# Patient Record
Sex: FEMALE | Race: WHITE | Employment: PART TIME | URBAN - METROPOLITAN AREA
[De-identification: names, ages, dates, MRNs, and addresses within clinical notes are randomized per-mention and may not be internally consistent; named-entity substitution may affect disease eponyms.]

---
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
## 2021-06-02 ENCOUNTER — HOSPITAL ENCOUNTER (EMERGENCY)
Age: 21
Discharge: HOME | End: 2021-06-02
Attending: EMERGENCY MEDICINE

## 2021-06-02 ENCOUNTER — APPOINTMENT (OUTPATIENT)
Dept: CT IMAGING | Age: 21
End: 2021-06-02

## 2021-06-02 VITALS
OXYGEN SATURATION: 98 % | SYSTOLIC BLOOD PRESSURE: 120 MMHG | DIASTOLIC BLOOD PRESSURE: 79 MMHG | TEMPERATURE: 98.4 F | HEART RATE: 71 BPM | RESPIRATION RATE: 16 BRPM

## 2021-06-02 DIAGNOSIS — S06.0X1A CONCUSSION WITH LOSS OF CONSCIOUSNESS OF 30 MINUTES OR LESS, INITIAL ENCOUNTER: Primary | ICD-10-CM

## 2021-06-02 PROCEDURE — 70450 CT HEAD/BRAIN W/O DYE: CPT

## 2021-06-02 PROCEDURE — 6370000000 HC RX 637 (ALT 250 FOR IP): Performed by: STUDENT IN AN ORGANIZED HEALTH CARE EDUCATION/TRAINING PROGRAM

## 2021-06-02 PROCEDURE — 99285 EMERGENCY DEPT VISIT HI MDM: CPT

## 2021-06-02 RX ORDER — ONDANSETRON 4 MG/1
4 TABLET, ORALLY DISINTEGRATING ORAL ONCE
Status: COMPLETED | OUTPATIENT
Start: 2021-06-02 | End: 2021-06-02

## 2021-06-02 RX ORDER — ACETAMINOPHEN 500 MG
1000 TABLET ORAL ONCE
Status: COMPLETED | OUTPATIENT
Start: 2021-06-02 | End: 2021-06-02

## 2021-06-02 RX ADMIN — ONDANSETRON 4 MG: 4 TABLET, ORALLY DISINTEGRATING ORAL at 03:30

## 2021-06-02 RX ADMIN — ACETAMINOPHEN 1000 MG: 500 TABLET ORAL at 03:30

## 2021-06-02 ASSESSMENT — VISUAL ACUITY
OD: 20/20
OU: 20/20
OS: 20/40

## 2021-06-02 ASSESSMENT — ENCOUNTER SYMPTOMS
VOMITING: 0
SHORTNESS OF BREATH: 0
PHOTOPHOBIA: 1
RHINORRHEA: 0
NAUSEA: 0
FACIAL SWELLING: 0
ABDOMINAL PAIN: 0
WHEEZING: 0

## 2021-06-02 ASSESSMENT — PAIN SCALES - GENERAL
PAINLEVEL_OUTOF10: 10
PAINLEVEL_OUTOF10: 10

## 2021-06-02 NOTE — ED TRIAGE NOTES
Pt to ED via EMS with c/o LOC at home and nausea. Pt reports she was wrestling with son at home, and they hit heads against each other. Pt family reports +LOC. Pt reports nausea and dry heaving since LOC. Pt is alert and oriented, NAD, RR even and unlabored.

## 2021-06-02 NOTE — ED PROVIDER NOTES
[address] [facility]     Emergency Department     Faculty Attestation    I performed a history and physical examination of the patient and discussed management with the resident. I have reviewed and agree with the residents findings including all diagnostic interpretations, and treatment plans as written. Any areas of disagreement are noted on the chart. I was personally present for the key portions of any procedures. I have documented in the chart those procedures where I was not present during the key portions. I have reviewed the emergency nurses triage note. I agree with the chief complaint, past medical history, past surgical history, allergies, medications, social and family history as documented unless otherwise noted below. Documentation of the HPI, Physical Exam and Medical Decision Making performed by scribnina is based on my personal performance of the HPI, PE and MDM. For Physician Assistant/ Nurse Practitioner cases/documentation I have personally evaluated this patient and have completed at least one if not all key elements of the E/M (history, physical exam, and MDM). Additional findings are as noted. 20 yo F pt had head to head impact with her child, + loc, vomit x 1, pt recalls event & was not knocked out for long time period,   -denies other injury,   pe vss flat affect, emily eomi, photophobic, no cervical tenderness, crepitus or deformity, no pronator drift, moving extremities x 4,     MIPS completed, ct head -, > discharged,     EKG Interpretation    Interpreted by me      CRITICAL CARE: There was a high probability of clinically significant/life threatening deterioration in this patient's condition which required my urgent intervention. Total critical care time was 0 minutes. This excludes any time for separately reportable procedures.        [address]  06/02/21 0305    MIPS 415     A head CT was ordered, but not by an emergency care provider: No    A head CT was ordered by an emergency care provider, and some of the indications for ordering the head CT included  Measure Exclusions:  Patient has a ventricular shunt: No  Patient has a brain tumor: No  Patient has multi-system trauma: No  Patient is pregnant: No  Patient is taking an antiplatelet medication (excluding aspirin): No  Patient is 72years old or older: No    Signs and Symptoms:  Patients GCS was less than 15: No  Focal neurological deficit: No  Severe Headache: Yes  Vomiting: Yes  Physical signs of a basilar skull fracture: No  Coagulopathy: No  Thrombocytopenia: No  Patient suspected of taking an anticoagulant medication: No  Dangerous mechanism of injury: No      Patient had loss of consciousness OR posttraumatic amnesia AND:   Headache: Yes  Patient is 61years old or older: No  Drug or Alcohol intoxication: No  Short-term memory deficits: No  Evidence of trauma above the clavicles (any visible or detected trauma to the head or neck, including lacerations, abrasions, bruising, swelling or fracture):  No  Post-traumatic seizure: No         Ilsa Burkitt, DO  06/02/21 0401       Ilsa Burkitt, DO  06/02/21 9111

## 2021-06-02 NOTE — ED NOTES
Pt. Resting in stretcher comfortably, NAD, no needs expressed at this time. Will continue to monitor and reassess.        Skylar Chin RN  06/02/21 0984

## 2021-06-02 NOTE — ED PROVIDER NOTES
[facility] ED  Emergency Department Encounter  Emergency Medicine Resident     Pt Name: Tony Clifton  MRN: [id number]  [place] 2000  Date of evaluation: 6/2/21  PCP:  No primary care provider on file. CHIEF COMPLAINT       Chief Complaint   Patient presents with    Loss of Consciousness     hit in head by son while wrestling    Nausea       HISTORY OFPRESENT ILLNESS  (Location/Symptom, Timing/Onset, Context/Setting, Quality, Duration, Modifying Factors,Severity.)      Tony Clifton is a 21 y. o.yo female who presents with loss of consciousness after hit in the head tonight. She states she was wrestling with her 3year-old son at home when the top of his head hit the left side of her forehead. This was witnessed by family members at home. She was told that she did pass out. She recalls the events leading up to the incident. She did not vomit but she is nauseous. She also complains of a slight headache where she was hit. She states she has sensitivity to light but no blurry vision. She is also complaining of diffuse neck pain but is able to move her neck well. No back pain or pain elsewhere. No extremity numbness or tingling. She is not anticoagulated. PAST MEDICAL / SURGICAL / SOCIAL / FAMILY HISTORY      has a past medical history of ADHD (attention deficit hyperactivity disorder), Anxiety, and Urinary reflux. has a past surgical history that includes Tonsillectomy and Tympanostomy tube placement.      Social History     Socioeconomic History    Marital status: Single     Spouse name: Not on file    Number of children: Not on file    Years of education: Not on file    Highest education level: Not on file   Occupational History    Not on file   Tobacco Use    Smoking status: Never Smoker    Smokeless tobacco: Never Used   Substance and Sexual Activity    Alcohol use: No    Drug use: No    Sexual activity: Not on file   Other Topics Concern    Not on file   Social History Narrative    Not on file     Social Determinants of Health     Financial Resource Strain:     Difficulty of Paying Living Expenses:    Food Insecurity:     Worried About Running Out of Food in the Last Year:     [address] in the Last Year:    Transportation Needs:     Lack of Transportation (Medical):  Lack of Transportation (Non-Medical):    Physical Activity:     Days of Exercise per Week:     Minutes of Exercise per Session:    Stress:     Feeling of Stress :    Social Connections:     Frequency of Communication with Friends and Family:     Frequency of Social Gatherings with Friends and Family:     Attends [employer] Services:     Active Member of Clubs or Organizations:     Attends Club or Organization Meetings:     Marital Status:    Intimate Partner Violence:     Fear of Current or Ex-Partner:     Emotionally Abused:     Physically Abused:     Sexually Abused:        No family history on file. Allergies:  Patient has no known allergies. Home Medications:  Prior to Admission medications    Medication Sig Start Date End Date Taking? Authorizing Provider   ondansetron (ZOFRAN ODT) 4 MG disintegrating tablet Take 1 tablet by mouth every 8 hours as needed for Nausea 10/18/17   Zuleyma Johnson MD   ibuprofen (ADVIL;MOTRIN) 600 MG tablet Take 1 tablet by mouth every 6 hours as needed for Pain 9/3/16   Jovani Kimball MD   famotidine (PEPCID) 20 MG tablet Take 1 tablet by mouth 2 times daily 7/10/16   Mercy Shelby MD   citalopram (CELEXA) 10 MG tablet Take 40 mg by mouth daily     Historical Provider, MD   methylphenidate (CONCERTA) 18 MG CR tablet Take 36 mg by mouth every morning     Historical Provider, MD   GuanFACINE ER (INTUNIV) 1 MG TB24 Take by mouth    Historical Provider, MD       REVIEW OFSYSTEMS    (2-9 systems for level 4, 10 or more for level 5)      Review of Systems   Constitutional: Negative for chills and fever.    HENT: Negative for congestion, facial swelling Nerves: No cranial nerve deficit. Sensory: No sensory deficit. Motor: No weakness. Coordination: Coordination normal.         DIFFERENTIAL  DIAGNOSIS     PLAN (LABS / IMAGING / EKG):  Orders Placed This Encounter   Procedures    CT HEAD WO CONTRAST    Visual acuity screening       MEDICATIONS ORDERED:  Orders Placed This Encounter   Medications    acetaminophen (TYLENOL) tablet 1,000 mg    ondansetron (ZOFRAN-ODT) disintegrating tablet 4 mg       Initial MDM/Plan: 21 y.o. female who presents with loss of consciousness after low impact mechanism to the left frontal region. This was witnessed. She is GCS 15. Both are equal and reactive. No focal neurological deficit. Normal sellar Lidia exam as well. No signs of basilar skull fracture. She is nauseous but she has not vomited. The plan is [facility] CT head rules, she is low risk and will not order a CT head to evaluate for intracranial hemorrhage at this time given the low likelihood of intracranial hemorrhage based on the signs and symptoms as described above. I will however keep patient for a brief observation period and if she worsens clinically will consider imaging at that time. In the meantime, will treat with Tylenol and Zofran to treat her symptoms. She also does have diffuse neck tenderness however there is no focal pain in the midline. She has full active range of motion in bilateral rotation without any pain. I have very low consideration for acute fracture dislocation. Again, she is neuro intact. Therefore hold on cervical imaging at this time. Overall, I feel the patient likely has a concussion. Again, we will keep for brief observation period. If she clinically worsens, will consider imaging. If she is able to be discharged, will need to discharge with thorough concussion instructions and return precautions.     DIAGNOSTIC RESULTS / EMERGENCYDEPARTMENT COURSE / MDM     LABS:  Labs Reviewed - No data to display      RADIOLOGY:  CT HEAD WO CONTRAST    Result Date: 6/2/2021  EXAMINATION: CT OF THE HEAD WITHOUT CONTRAST  6/2/2021 4:17 am TECHNIQUE: CT of the head was performed without the administration of intravenous contrast. Dose modulation, iterative reconstruction, and/or weight based adjustment of the mA/kV was utilized to reduce the radiation dose to as low as reasonably achievable. COMPARISON: CT head without contrast March 24, 2018. HISTORY: ORDERING SYSTEM PROVIDED HISTORY: head injury TECHNOLOGIST PROVIDED HISTORY: head injury Decision Support Exception - unselect if not a suspected or confirmed emergency medical condition->Emergency Medical Condition (MA) Is the patient pregnant?->No Reason for Exam: head injury Acuity: Acute Type of Exam: Initial FINDINGS: BRAIN/VENTRICLES: There is no acute intracranial hemorrhage, mass effect or midline shift. No abnormal extra-axial fluid collection. The gray-white differentiation is maintained without evidence of an acute infarct. There is no evidence of hydrocephalus. ORBITS: The visualized portion of the orbits demonstrate no acute abnormality. SINUSES: The visualized paranasal sinuses and mastoid air cells demonstrate no acute abnormality. SOFT TISSUES/SKULL:  No acute abnormality of the visualized skull or soft tissues. No acute intracranial abnormality. EKG  none    All EKG's are interpreted by the Emergency Department Physicianwho either signs or Co-signs this chart in the absence of a cardiologist.    EMERGENCY DEPARTMENT COURSE:  ED Course as of Jun 02 0516 Wed Jun 02, 2021   0359 Patient was reevaluated after approximately 1 and half hours in the emergency department. She has not had much improvement. She is about the same as when she came in. When asked where she is compared to her normal baseline, she states she is normally bouncing off the walls with energy but now she feels much more tired.   Given the duration since the incident and she is having delayed time to return back to normal, I believe is appropriate at this time to pursue a CT head to rule out serious intracranial pathology. Patient was counseled on the risk and benefits of obtaining a CT scan she is in agreement with plan to pursue this imaging.    [KD]   0515 Head is unremarkable. Patient was reevaluated, there has been a slight improvement in symptoms. I did explain to patient that she has been diagnosed with a concussion. I did give her careful discharge instructions including strict rest for the next 48 hours and a slow return to activity as she can tolerate. If she does not tolerate this activity, then she should refrain from doing that activity until she can tolerate it. She must be cleared by a physician prior to returning to any contact activity. She expresses understanding and she does have a fiancé at home back in care for her and her child. I do that she is appropriate for discharge at this time and she understands return precautions. [KD]      ED Course User Index  [KD] Etelvina Reynoso DO          PROCEDURES:  None    CONSULTS:  None    CRITICAL CARE:  none    FINAL IMPRESSION      1.  Concussion with loss of consciousness of 30 minutes or less, initial encounter          DISPOSITION / PLAN     DISPOSITION Decision To Discharge 06/02/2021 05:08:24 AM      PATIENT REFERRED TO:  [place] [address]  [phone]  Schedule an appointment as soon as possible for a visit       [facility] ED  [address] [place] [address]  [phone]    As needed      DISCHARGE MEDICATIONS:  New Prescriptions    No medications on file       Etelvina Reynoso DO  Emergency Medicine Resident    (Please note that portions of this note were completed with a voice recognition program.Efforts were made to edit the dictations but occasionally words are mis-transcribed.)        Etelvina Reynoso DO  Resident  06/02/21 8129

## 2021-06-02 NOTE — ED NOTES
Bed: 21  Expected date: 6/2/21  Expected time: 2:30 AM  Means of arrival: [facility]  Comments:  Akash Delgado RN  06/02/21 2448

## 2022-01-04 ENCOUNTER — ROUTINE PRENATAL (OUTPATIENT)
Dept: OBGYN CLINIC | Age: 22
End: 2022-01-04
Payer: MEDICAID

## 2022-01-04 VITALS
BODY MASS INDEX: 28.8 KG/M2 | DIASTOLIC BLOOD PRESSURE: 84 MMHG | HEIGHT: 67 IN | WEIGHT: 183.5 LBS | SYSTOLIC BLOOD PRESSURE: 118 MMHG

## 2022-01-04 DIAGNOSIS — Z3A.29 29 WEEKS GESTATION OF PREGNANCY: Primary | ICD-10-CM

## 2022-01-04 DIAGNOSIS — Z82.79 FAMILY HISTORY OF CLEFT LIP AND PALATE: ICD-10-CM

## 2022-01-04 DIAGNOSIS — R73.09 GLUCOSE TOLERANCE TEST ABNORMAL: ICD-10-CM

## 2022-01-04 PROCEDURE — 99202 OFFICE O/P NEW SF 15 MIN: CPT | Performed by: OBSTETRICS & GYNECOLOGY

## 2022-01-04 NOTE — PROGRESS NOTES
Kelli Stephenson  2022    YOB: 2000      HPI:  Christy Mondragon is a 24 y.o. female Kyree Dyke     Transfer of care from [facility] at 29.1 weeks gestation  Patient states she was gestational diabetic in her last pregnancy - diet controlled. She has had no complications so far in this pregnancy. Prior pregnancy was a vaginal delivery. Patient took 1 hr GTT on  and failed it at 149. Started doing home glucose monitoring and has failed most of her sugars ranging from 140- to a high of 212 but has not changed her diet at all yet. Patient is agreeable to doing 3 hr GTT to confirm diabetes and plans to start carbohydrate counting and eating mostly fruits vegetables and meat to try to be diet controlled again this pregnancy. Is feeling normal fetal movement at a minimum of 4 movements per hour for several weeks. No vaginal bleeding since missed menses. States her prior OBGYN gave her an [address] of 3/21/2022. Initiated prenatal care in second trimester at approximately 19 weeks she thinks. Patient is not involved with FOB who is the father of this pregnancy as well as her two year old son. Patient's mother said that patient was told she needed a follow up ultrasound at her anatomy US to have a more detailed evaluation of fetal facial views due to family history of cleft lip / cleft palate.           OB History    Para Term  AB Living   2 1 1     1   SAB IAB Ectopic Molar Multiple Live Births             1      # Outcome Date GA Lbr Saul/2nd Weight Sex Delivery Anes PTL Lv   2 Current            1 Term 19 38w5d   M Vag-Spont   JOSUÉ       Past Medical History:   Diagnosis Date    ADHD (attention deficit hyperactivity disorder)     Anxiety     Urinary reflux        Past Surgical History:   Procedure Laterality Date    TONSILLECTOMY      TYMPANOSTOMY TUBE PLACEMENT         MEDICATIONS:  Current Outpatient Medications   Medication Sig Dispense Refill    Calcium Carbonate Antacid (TUMS PO) Take by mouth      Prenatal Vit-Fe Fumarate-FA (PRENATAL PO) Take by mouth       No current facility-administered medications for this visit. ALLERGIES:  Allergies as of 2022    (No Known Allergies)     Review Of Systems (11 point):  Constitutional: No fever, chills or malaise; No weight change or fatigue  Head and Eyes: No vision, Headache, Dizziness or trauma in last 12 months  ENT ROS: No hearing, Tinnitis, sinus or taste problems  Hematological and Lymphatic ROS:No Lymphoma, Von Willebrand's, Hemophillia or Bleeding History  Psych ROS: No Depression, Homicidal thoughts,suicidal thoughts, or anxiety  Breast ROS: No prior breast abnormalities or lumps  Respiratory ROS: No SOB, Pneumoniae,Cough, or Pulmonary Embolism History  Cardiovascular ROS: No Chest Pain with Exertion, Palpitations, Syncope, Edema, Arrhythmia  Gastrointestinal ROS: No Indigestion, Heartburn, Nausea, vomiting, Diarrhea, Constipation,or Bowel Changes; No Bloody Stools or melena  Genito-Urinary ROS: No Dysuria, Hematuria or Nocturia. No Urinary Incontinence or Vaginal Discharge  Musculoskeletal ROS: No Arthralgia, Arthritis,Gout,Osteoporosis or Rheumatism  Neurological ROS: No CVA, Migraines, Epilepsy, Seizure Hx, or Limb Weakness  Dermatological ROS: No Rash, Itching, Hives, Mole Changes or Cancer      Vitals  BP: 118/84  Weight: 183 lb 8 oz (83.2 kg)    + FM  NO VB  NO LOF  NO CTX  No complaints or issues today     Transfer of care at 29 weeks gestation  Family hx of cleft lip/palate  Failed 1 hr GTT: 149  Scheduled for 3 hr GTT  Scheduled with MFM for facial views US follow up from anatomy  Hx GDM diet controlled last pregnancy      Assessment:  1Sonya Clifton is a 24 y.o. female  2.   3. 29w1d  4. Glucose test abnormal     Diagnosis Orders   1. 29 weeks gestation of pregnancy  Chris Casillas MD, Maternal Fetal Medicine, [facility]   2.  Glucose tolerance test abnormal  Glucose tolerance, 1 hour   3. Family history of cleft lip and palate  Lg Mars MD, Maternal Fetal Medicine, [facility]:  The patient will return to the office for her next visit in 2 weeks. See antepartum flow sheet.    3 hr GTT ordered  rx for MFM referral for US to evaluate fetal structures due to family hx of cleft lip/ palate

## 2022-01-10 ENCOUNTER — HOSPITAL ENCOUNTER (OUTPATIENT)
Age: 22
Discharge: HOME | End: 2022-01-10
Attending: OBSTETRICS & GYNECOLOGY | Admitting: OBSTETRICS & GYNECOLOGY
Payer: MEDICAID

## 2022-01-10 VITALS
SYSTOLIC BLOOD PRESSURE: 129 MMHG | RESPIRATION RATE: 18 BRPM | HEART RATE: 66 BPM | DIASTOLIC BLOOD PRESSURE: 63 MMHG | TEMPERATURE: 98 F

## 2022-01-10 PROBLEM — Z76.89: Status: ACTIVE | Noted: 2022-01-10

## 2022-01-10 PROBLEM — Z72.0 TOBACCO USE: Status: ACTIVE | Noted: 2022-01-10

## 2022-01-10 PROBLEM — R80.0 ISOLATED PROTEINURIA: Status: ACTIVE | Noted: 2022-01-10

## 2022-01-10 PROBLEM — N39.0 UTI (URINARY TRACT INFECTION): Status: ACTIVE | Noted: 2022-01-10

## 2022-01-10 PROBLEM — Z82.79 FAMILY HISTORY OF CLEFT PALATE: Status: ACTIVE | Noted: 2022-01-10

## 2022-01-10 PROBLEM — O99.810 ABNORMAL GLUCOSE TOLERANCE AFFECTING PREGNANCY, ANTEPARTUM: Status: ACTIVE | Noted: 2022-01-10

## 2022-01-10 PROBLEM — Z86.32 HISTORY OF GESTATIONAL DIABETES: Status: ACTIVE | Noted: 2022-01-10

## 2022-01-10 PROBLEM — Z3A.30 30 WEEKS GESTATION OF PREGNANCY: Status: ACTIVE | Noted: 2022-01-10

## 2022-01-10 LAB
-: ABNORMAL
ABSOLUTE EOS #: 0.12 K/UL (ref 0–0.44)
ABSOLUTE IMMATURE GRANULOCYTE: 0.09 K/UL (ref 0–0.3)
ABSOLUTE LYMPH #: 2.48 K/UL (ref 1.1–3.7)
ABSOLUTE MONO #: 1.15 K/UL (ref 0.1–1.4)
ALBUMIN SERPL-MCNC: 3.2 G/DL (ref 3.5–5.2)
ALBUMIN/GLOBULIN RATIO: 1 (ref 1–2.5)
ALP BLD-CCNC: 100 U/L (ref 35–104)
ALT SERPL-CCNC: <5 U/L (ref 5–33)
AMORPHOUS: ABNORMAL
ANION GAP SERPL CALCULATED.3IONS-SCNC: 12 MMOL/L (ref 9–17)
AST SERPL-CCNC: 22 U/L
BACTERIA: ABNORMAL
BASOPHILS # BLD: 0 % (ref 0–2)
BASOPHILS ABSOLUTE: 0.05 K/UL (ref 0–0.2)
BILIRUB SERPL-MCNC: <0.1 MG/DL (ref 0.3–1.2)
BILIRUBIN URINE: NEGATIVE
BUN BLDV-MCNC: 6 MG/DL (ref 6–20)
BUN/CREAT BLD: ABNORMAL (ref 9–20)
CALCIUM SERPL-MCNC: 8.5 MG/DL (ref 8.6–10.4)
CANDIDA SPECIES, DNA PROBE: NEGATIVE
CASTS UA: ABNORMAL /LPF (ref 0–8)
CHLORIDE BLD-SCNC: 106 MMOL/L (ref 98–107)
CO2: 15 MMOL/L (ref 20–31)
COLOR: YELLOW
CREAT SERPL-MCNC: 0.5 MG/DL (ref 0.5–0.9)
CREATININE URINE: 56 MG/DL (ref 28–217)
CRYSTALS, UA: ABNORMAL /HPF
DIFFERENTIAL TYPE: ABNORMAL
EOSINOPHILS RELATIVE PERCENT: 1 % (ref 1–4)
EPITHELIAL CELLS UA: ABNORMAL /HPF (ref 0–5)
GARDNERELLA VAGINALIS, DNA PROBE: POSITIVE
GFR AFRICAN AMERICAN: >60 ML/MIN
GFR NON-AFRICAN AMERICAN: >60 ML/MIN
GFR SERPL CREATININE-BSD FRML MDRD: ABNORMAL ML/MIN/{1.73_M2}
GFR SERPL CREATININE-BSD FRML MDRD: ABNORMAL ML/MIN/{1.73_M2}
GLUCOSE BLD-MCNC: 81 MG/DL (ref 70–99)
GLUCOSE URINE: ABNORMAL
HCT VFR BLD CALC: 32.5 % (ref 36.3–47.1)
HEMOGLOBIN: 10.3 G/DL (ref 11.9–15.1)
IMMATURE GRANULOCYTES: 1 %
KETONES, URINE: NEGATIVE
LEUKOCYTE ESTERASE, URINE: ABNORMAL
LYMPHOCYTES # BLD: 20 % (ref 25–45)
MCH RBC QN AUTO: 26.7 PG (ref 25.2–33.5)
MCHC RBC AUTO-ENTMCNC: 31.7 G/DL (ref 28.4–34.8)
MCV RBC AUTO: 84.2 FL (ref 82.6–102.9)
MONOCYTES # BLD: 9 % (ref 2–8)
MUCUS: ABNORMAL
NITRITE, URINE: NEGATIVE
NRBC AUTOMATED: 0 PER 100 WBC
OTHER OBSERVATIONS UA: ABNORMAL
PDW BLD-RTO: 13.2 % (ref 11.8–14.4)
PH UA: 7 (ref 5–8)
PLATELET # BLD: 286 K/UL (ref 138–453)
PLATELET ESTIMATE: ABNORMAL
PMV BLD AUTO: 8.5 FL (ref 8.1–13.5)
POTASSIUM SERPL-SCNC: 4.1 MMOL/L (ref 3.7–5.3)
PROTEIN UA: ABNORMAL
RBC # BLD: 3.86 M/UL (ref 3.95–5.11)
RBC # BLD: ABNORMAL 10*6/UL
RBC UA: ABNORMAL /HPF (ref 0–4)
RENAL EPITHELIAL, UA: ABNORMAL /HPF
SARS-COV-2, RAPID: NOT DETECTED
SEG NEUTROPHILS: 69 % (ref 34–64)
SEGMENTED NEUTROPHILS ABSOLUTE COUNT: 8.42 K/UL (ref 1.5–8.1)
SODIUM BLD-SCNC: 133 MMOL/L (ref 135–144)
SOURCE: ABNORMAL
SPECIFIC GRAVITY UA: 1.01 (ref 1–1.03)
SPECIMEN DESCRIPTION: NORMAL
TOTAL PROTEIN, URINE: 19 MG/DL
TOTAL PROTEIN: 6.3 G/DL (ref 6.4–8.3)
TRICHOMONAS VAGINALIS DNA: NEGATIVE
TRICHOMONAS: ABNORMAL
TURBIDITY: CLEAR
URINE HGB: NEGATIVE
URINE TOTAL PROTEIN CREATININE RATIO: 0.34 (ref 0–0.2)
UROBILINOGEN, URINE: NORMAL
WBC # BLD: 12.3 K/UL (ref 4.5–13.5)
WBC # BLD: ABNORMAL 10*3/UL
WBC UA: ABNORMAL /HPF (ref 0–5)
YEAST: ABNORMAL

## 2022-01-10 PROCEDURE — 87491 CHLMYD TRACH DNA AMP PROBE: CPT

## 2022-01-10 PROCEDURE — 84156 ASSAY OF PROTEIN URINE: CPT

## 2022-01-10 PROCEDURE — 87086 URINE CULTURE/COLONY COUNT: CPT

## 2022-01-10 PROCEDURE — 81001 URINALYSIS AUTO W/SCOPE: CPT

## 2022-01-10 PROCEDURE — 99281 EMR DPT VST MAYX REQ PHY/QHP: CPT

## 2022-01-10 PROCEDURE — 85025 COMPLETE CBC W/AUTO DIFF WBC: CPT

## 2022-01-10 PROCEDURE — 36415 COLL VENOUS BLD VENIPUNCTURE: CPT

## 2022-01-10 PROCEDURE — 82570 ASSAY OF URINE CREATININE: CPT

## 2022-01-10 PROCEDURE — 87480 CANDIDA DNA DIR PROBE: CPT

## 2022-01-10 PROCEDURE — 80053 COMPREHEN METABOLIC PANEL: CPT

## 2022-01-10 PROCEDURE — 87591 N.GONORRHOEAE DNA AMP PROB: CPT

## 2022-01-10 PROCEDURE — 87635 SARS-COV-2 COVID-19 AMP PRB: CPT

## 2022-01-10 PROCEDURE — 87660 TRICHOMONAS VAGIN DIR PROBE: CPT

## 2022-01-10 PROCEDURE — 87510 GARDNER VAG DNA DIR PROBE: CPT

## 2022-01-10 PROCEDURE — 99213 OFFICE O/P EST LOW 20 MIN: CPT

## 2022-01-10 RX ORDER — ONDANSETRON 2 MG/ML
4 INJECTION INTRAMUSCULAR; INTRAVENOUS EVERY 6 HOURS PRN
Status: DISCONTINUED | OUTPATIENT
Start: 2022-01-10 | End: 2022-01-11 | Stop reason: HOSPADM

## 2022-01-10 RX ORDER — METRONIDAZOLE 500 MG/1
500 TABLET ORAL 2 TIMES DAILY
Qty: 14 TABLET | Refills: 0 | Status: SHIPPED | OUTPATIENT
Start: 2022-01-10 | End: 2022-01-17

## 2022-01-10 RX ORDER — ACETAMINOPHEN 500 MG
1000 TABLET ORAL EVERY 6 HOURS PRN
Status: DISCONTINUED | OUTPATIENT
Start: 2022-01-10 | End: 2022-01-11 | Stop reason: HOSPADM

## 2022-01-10 RX ORDER — ONDANSETRON 4 MG/1
4 TABLET, ORALLY DISINTEGRATING ORAL EVERY 8 HOURS PRN
Status: DISCONTINUED | OUTPATIENT
Start: 2022-01-10 | End: 2022-01-11 | Stop reason: HOSPADM

## 2022-01-10 RX ORDER — NITROFURANTOIN 25; 75 MG/1; MG/1
100 CAPSULE ORAL 2 TIMES DAILY
Qty: 14 CAPSULE | Refills: 0 | Status: SHIPPED | OUTPATIENT
Start: 2022-01-10 | End: 2022-01-17

## 2022-01-10 NOTE — H&P
OBSTETRICAL HISTORY [facility]    Date: 1/10/2022       Time: 9:58 PM   Patient Name: Micheal Tucker     Patient : 2000  Room/Bed: [place] [address]    Admission Date/Time: 1/10/2022  6:10 PM      CC: periumbilical abdominal pain, nausea     HPI: Micheal Tucker is a 24 y.o.  at 30w0d who presents to Labor and Delivery with the chief complaint of periumbilical/suprapubic pain that began at approximately 1600 today. She endorses nausea but no vomiting. The pain has not changed or radiated elsewhere. She denies abdominal trauma. Denies any diarrhea or constipation. Denies recent sick contacts, rhinorrhea, congestion, cough, shortness of breath, chest pain. The patient reports fetal movement is present, denies contractions, denies loss of fluid, denies vaginal bleeding. Patient denies any headache, visual changes, difficulty breathing, RUQ pain, F/C, and pain/swelling in lower extremities. Denies any dysuria or vaginal discharge. DATING:  LMP: Patient's last menstrual period was 2021.   Estimated Date of Delivery: 3/21/22   Based on: LMP on 21    PREGNANCY RISK FACTORS:  Patient Active Problem List   Diagnosis    Family history of cleft palate    Elv 1hr GTT, no 3hr GTT    Hx GDMA1    Hx UTI x1    Tobacco use    30 weeks gestation of pregnancy        Steroids Given In This Pregnancy:  no     REVIEW OF SYSTEMS:   Constitutional: negative fever, negative chills, negative weight changes   HEENT: negative visual disturbances, negative headaches, negative dizziness, negative hearing loss  Breast: Negative breast abnormalities, negative breast lumps, negative nipple discharge  Respiratory: negative dyspnea, negative cough, negative SOB  Cardiovascular: negative chest pain,  negative palpitations, negative arrhythmia, negative syncope   Gastrointestinal: positive abdominal pain, negative RUQ pain, positive nausea, negative vomiting, negative diarrhea, negative constipation, negative bowel changes, negative heartburn   Genitourinary: negative dysuria, negative hematuria, negative urinary incontinence, negative vaginal discharge, negative vaginal bleeding or spotting  Dermatological: negative rash, negative pruritis, negative mole or other skin changes  Hematologic: negative bruising  Immunologic/Lymphatic: negative recent illness, negative recent sick contact  Musculoskeletal: negative back pain, negative myalgias, negative arthralgias  Neurological:  negative dizziness, negative migraines, negative seizures, negative weakness  Behavior/Psych: negative depression, negative anxiety, negative SI, negative HI    OBSTETRICAL HISTORY:   OB History    Para Term  AB Living   2 1 1 0 0 1   SAB IAB Ectopic Molar Multiple Live Births   0 0 0 0 0 1      # Outcome Date GA Lbr Saul/2nd Weight Sex Delivery Anes PTL Lv   2 Current            1 Term 19 38w5d   M Vag-Spont   JOSUÉ       PAST MEDICAL HISTORY:   has a past medical history of ADHD (attention deficit hyperactivity disorder), Anxiety, and Urinary reflux. PAST SURGICAL HISTORY:   has a past surgical history that includes Tonsillectomy and Tympanostomy tube placement. ALLERGIES:  has No Known Allergies. MEDICATIONS:  Prior to Admission medications    Medication Sig Start Date End Date Taking? Authorizing Provider   nitrofurantoin, macrocrystal-monohydrate, (MACROBID) 100 MG capsule Take 1 capsule by mouth 2 times daily for 7 days 1/10/22 1/17/22 Yes Keyla Lucas DO   metroNIDAZOLE (FLAGYL) 500 MG tablet Take 1 tablet by mouth 2 times daily for 7 days 1/10/22 1/17/22 Yes Keyla Lucas DO   Calcium Carbonate Antacid (TUMS PO) Take by mouth   Yes Historical Provider, MD   Prenatal Vit-Fe Fumarate-FA (PRENATAL PO) Take by mouth   Yes Historical Provider, MD       FAMILY HISTORY:  family history is not on file. SOCIAL HISTORY:   reports that she has never smoked.  She has never used smokeless tobacco. She reports that she does not drink alcohol and does not use drugs.     VITALS:  Vitals:    01/10/22 1832   BP: 129/63   Pulse: 66   Resp: 18   Temp: 98 °F (36.7 °C)         PHYSICAL EXAM:  Fetal Heart Monitor:  Baseline Heart Rate 135 bpm, moderate variability, present accelerations, absent decelerations  [place]: contractions, none    General appearance:  no apparent distress, alert and cooperative  HEENT: head atraumatic, normocephalic, moist mucous membranes, trachea midline  Neurologic:  alert, oriented, normal speech, no focal findings or movement disorder noted  Lungs:  No increased work of breathing, good air exchange  Heart:  regular rate and rhythm  Abdomen:  soft, gravid, tender to palpation midline periumbilical/ suprapubic, no rebound, guarding, or rigidity, no RUQ or epigastric tenderness, no signs or symptoms of abruption, no signs or symptoms of chorioamnionitis  Extremities:  no calf tenderness, non edematous, no varicosities, full range of motion in all four extremities  Musculoskeletal: Gross strength equal and intact throughout, no gross abnormalities, range of motion normal in hips, knees, shoulders and spine, CVA tenderness: none  Psychiatric: Mood appropriate, normal affect   Rectal Exam: not indicated  Pelvic Exam:   Chaperone for Intimate Exam: Chaperone was present for entire exam, Chaperone Name: Wyman Curling RN  Sterile Speculum Exam:   Urethral meatus: normal appearing   Vulva: Normal hair distribution, normal appearing vulva, no masses, tenderness or lesions, normal clitoris   Vagina: Normal appearing vaginal mucosa without lesions, moderate vaginal discharge noted in the posterior vault, no lacerations   Cervix: Normal appearing cervix without lesions, external os visibly closed, no lacerations or abnormal lesions visualized, no bleeding  Sterile Vaginal Exam:  Cervix: No cervical motion tenderness   Uterus: Is gravid, Normal size, shape, consistency and non-tender  Cervix: Closed dilated, 0 % effaced, -3 station, posterior position (out of 3 station), firm consistency    PRENATAL LAB RESULTS:   Blood Type/Rh: A neg  Antibody Screen: negative  Hemoglobin, Hematocrit, Platelets: Hgb 30.6/YYU 34.7/Plt 341  Rubella: immune  T. Pallidum, IgG: non-reactive  Hepatitis B Surface Antigen: non-reactive   Hepatitis C Antibody: non-reactive   HIV: non-reactive   Sickle Cell Screen: not done  Gonorrhea: not done  Chlamydia: not done  Urine culture: not done    1 hour Glucose Tolerance Test: 149  3 hour Glucose Tolerance Test: not done    Group B Strep: not done  Cystic Fibrosis Screen: not done  First Trimester Screen: not available  MSAFP/Multiple Markers: not available  Non-Invasive Prenatal Testing: not available  Anatomy US: not available    ASSESSMENT & PLAN:  Christy Mondragon is a 24 y.o. female  at 30w0d IUP   - GBS unknown / Rh negative / R immune   - Pen G for GBS prophylaxis if delivery imminent    - VSS, afebrile    Abdominal pain, nausea   - VSS, afebrile, BP normotensive   - PreE labs wnl, P/C 0.34   - CBC unremarkable, WBC wnl   - COVID19 negative   - cEFM/TOCO: Cat I tracing, no contractions   - SSE: normal vaginal mucosa, normal appearing cervix, external os visibly closed, moderate discharge present, no bleeding or lesions   - SVE: closed/thick/high   - Vaginitis positive for Bacterial vaginosis; Rx for Flagyl given   - Gc/C pending, will follow results   - UA suspicious for infection; Rx for Macrobid given   - UCx pending, will follow resultls   - FFN not collected as patient had recent intercourse   - PO hydration   - Patient declined Tylenol or Zofran   - Next OB appointment: 22   - Next MFM appointment: 22    Upon re-evaluation, patient reports resolution of her symptoms. FHT reviewed in entirety with senior resident and noted to be Category I. Patient may be discharged home.  Labor precautions, bleeding precautions, adequate PO hydration, fetal kick counts reviewed with the patient. All questions answered and concerns addressed. Patient verbalized understanding. Late transfer of care   - Patient transferred from [address] [place] [address] in [facility] at 29 weeks   - Records scanned into Media section    Elevated 1 hr GTT, no 3 hr   - Encouraged patient to complete 3 hr GTT     Hx GDMA1 (G1)    FHx cleft palate   - Patient's brother   - Patient reports she had an anatomy scan completed   - US from previous OB provider not available. Patient has MFM appt scheduled for 2/22/22 for interval scan    Tobacco use   - Encourage cessation    BMI 28    Patient Active Problem List    Diagnosis Date Noted    Family history of cleft palate 01/10/2022    Elv 1hr GTT, no 3hr GTT 01/10/2022    Hx GDMA1 01/10/2022    Hx UTI x1 01/10/2022    Tobacco use 01/10/2022    30 weeks gestation of pregnancy 01/10/2022       Plan discussed with Dr. Chai Kulkarni, who is agreeable. Steroids given this admission: No    Risks, benefits, alternatives and possible complications have been discussed in detail with the patient. Admission, and post admission procedures and expectations were discussed in detail. All questions were answered.     Attending's Name: Dr. Jason Carrera DO  Ob/Gyn Resident  1/10/2022, 9:58 PM

## 2022-01-10 NOTE — FLOWSHEET NOTE
Pt presents to [facility], accompanied by mother - garth also her legal guardian, via ambulatory from the ED. Pt here for abdominal pain since 4pm and nausea since the pt woke up this AM.    Pt gowned and in bed, oriented to room and call light. EFM explained and applied. FHT's 145. Dr. Flavia Jean notified of admission.

## 2022-01-11 LAB
C TRACH DNA GENITAL QL NAA+PROBE: NEGATIVE
CULTURE: NO GROWTH
Lab: NORMAL
N. GONORRHOEAE DNA: NEGATIVE
SPECIMEN DESCRIPTION: NORMAL
SPECIMEN DESCRIPTION: NORMAL

## 2022-01-20 ENCOUNTER — ROUTINE PRENATAL (OUTPATIENT)
Dept: OBGYN CLINIC | Age: 22
End: 2022-01-20
Payer: MEDICAID

## 2022-01-20 VITALS — BODY MASS INDEX: 29.13 KG/M2 | WEIGHT: 186 LBS | DIASTOLIC BLOOD PRESSURE: 64 MMHG | SYSTOLIC BLOOD PRESSURE: 116 MMHG

## 2022-01-20 DIAGNOSIS — Z3A.31 31 WEEKS GESTATION OF PREGNANCY: Primary | ICD-10-CM

## 2022-01-20 DIAGNOSIS — R73.09 ELEVATED GLUCOSE TOLERANCE TEST: ICD-10-CM

## 2022-01-20 DIAGNOSIS — O26.893 RH NEGATIVE STATUS DURING PREGNANCY IN THIRD TRIMESTER: ICD-10-CM

## 2022-01-20 DIAGNOSIS — Z23 NEED FOR TDAP VACCINATION: ICD-10-CM

## 2022-01-20 DIAGNOSIS — Z67.91 RH NEGATIVE STATUS DURING PREGNANCY IN THIRD TRIMESTER: ICD-10-CM

## 2022-01-20 DIAGNOSIS — O09.30 LATE PRENATAL CARE AFFECTING PREGNANCY, ANTEPARTUM: ICD-10-CM

## 2022-01-20 PROCEDURE — G8484 FLU IMMUNIZE NO ADMIN: HCPCS | Performed by: NURSE PRACTITIONER

## 2022-01-20 PROCEDURE — 90715 TDAP VACCINE 7 YRS/> IM: CPT | Performed by: NURSE PRACTITIONER

## 2022-01-20 PROCEDURE — G8419 CALC BMI OUT NRM PARAM NOF/U: HCPCS | Performed by: NURSE PRACTITIONER

## 2022-01-20 PROCEDURE — G8427 DOCREV CUR MEDS BY ELIG CLIN: HCPCS | Performed by: NURSE PRACTITIONER

## 2022-01-20 PROCEDURE — 99213 OFFICE O/P EST LOW 20 MIN: CPT | Performed by: NURSE PRACTITIONER

## 2022-01-20 PROCEDURE — 90471 IMMUNIZATION ADMIN: CPT | Performed by: NURSE PRACTITIONER

## 2022-01-20 PROCEDURE — 96372 THER/PROPH/DIAG INJ SC/IM: CPT | Performed by: NURSE PRACTITIONER

## 2022-01-20 PROCEDURE — 1036F TOBACCO NON-USER: CPT | Performed by: NURSE PRACTITIONER

## 2022-01-20 NOTE — PROGRESS NOTES
Presents for OB visit  Gestation 31w3d  Estimated Date of Delivery: 3/21/22    Transfer of care at 34 weeks gestation  Family hx of cleft lip/palate  Failed 1 hr GTT: 149  Scheduled for 3 hr GTT  Scheduled with MFM for facial views US follow up from anatomy 22  Hx GDM diet controlled last pregnancy      - antibody screening 2021- no trauma to abdomen or vaginal bleeding- it was verified she did not receive rhogam through previous OB rhogam given 2022     Do not have Anatomy US on file from [address]  contacted and stated no anatomy US on file stating they only saw her once  She has US scheduled with MFM 22        OB History    Para Term  AB Living   2 1 1     1   SAB IAB Ectopic Molar Multiple Live Births             1      # Outcome Date GA Lbr Saul/2nd Weight Sex Delivery Anes PTL Lv   2 Current            1 Term 19 38w5d   M Vag-Spont   JOSUÉ            No Known Allergies  Current Outpatient Medications   Medication Sig Dispense Refill    Calcium Carbonate Antacid (TUMS PO) Take by mouth      Prenatal Vit-Fe Fumarate-FA (PRENATAL PO) Take by mouth       No current facility-administered medications for this visit. Past Medical History:   Diagnosis Date    ADHD (attention deficit hyperactivity disorder)     Anxiety     Urinary reflux        Past Surgical History:   Procedure Laterality Date    TONSILLECTOMY      TYMPANOSTOMY TUBE PLACEMENT       Headaches: no  Swelling: no  Bleeding: no  Discharge: no   Dysuria: no  Nausea: no  Heartburn: no  Epigastric pain: no  Contractions: no  Leakage of fluid: no  + fetal movement       Return 2 WEEKS    Labs as indicated 3 hr GTT needs to be completed states going tomorrow    PTL signs reviewed, if indicated  Kick Count Instructions given if indicated: The patient was instructed on fetal kick counts and was given a kick sheet to complete every 8 hours. This is to begin at 28 weeks gestation.  She was instructed that the baby should move at a minimum of ten times within one hour after a meal. The patient was instructed to lay down on her left side twenty minutes after eating and count movements for up to one hour with a target value of ten movements. She was instructed to notify the office if she did not make that target after two attempts or if after any attempt there was less than four movements. After hour numbers reviewed , along with labor signs if indicated. Contractions/cramping between 5-7 minutes and persisting even with attempts of increased water and laying on side. Fluid leakage, bleeding    The patient was counseled on Labor & Delivery. Route of delivery and counseling on vaginal, operative vaginal, and  sections were completed with the risks of each to both the patient as well as her baby. The possibility of a blood transfusion was discussed as well. The patient was not opposed to receiving a transfusion if needed. The patient was counseled on types of analgesia during labor and is considering either Regional or IV medication the risks, benefits and alternatives were discussed. The patient was counseled on the mandatory call ahead policy. She has been instructed to call the office at anytime prior to going into the hospital so the on-call physician may direct her to the appropriate facility for care. Exceptions were reviewed including but not limited to: Decreased fetal movement, vaginal Bleeding or hemorrhage, trauma, readily expectant delivery, or any instance where she feels 911 should be utilized. Of the 20 minute duration appointment visit, Efren Kimball CNP spent at least 50% of the face-to-face time in counseling, explanation of diagnosis, planning of further management, and answering all questions.

## 2022-01-20 NOTE — PATIENT INSTRUCTIONS
After Hours Number: You can call the office [phone]  or [phone]  Call if you have:  1. Bleeding like a period  2. Cramps or contractions greater than 2 hours  3. If you are leaking fluid  4. If you've a fever greater than 100°  5. If you feel as if baby is not moving  6. If you have continuous vomiting over 3-4 hours   Counting Your Baby's Kicks: Care Instructions  Your Care Instructions    Counting your baby's kicks is one way your doctor can tell that your baby is healthy. Most women--especially in a first pregnancy--feel their baby move for the first time between 16 and 22 weeks. The movement may feel like flutters rather than kicks. Your baby may move more at certain times of the day. When you are active, you may notice less kicking than when you are resting. At your prenatal visits, your doctor will ask whether the baby is active. In your last trimester, your doctor may ask you to count the number of times you feel your baby move. Follow-up care is a key part of your treatment and safety. Be sure to make and go to all appointments, and call your doctor if you are having problems. It's also a good idea to know your test results and keep a list of the medicines you take. How do you count fetal kicks? · A common method of checking your baby's movement is to count the number of kicks or moves you feel in 1 hour. Ten movements (such as kicks, flutters, or rolls) in 1 hour are normal. Some doctors suggest that you count in the morning until you get to 10 movements. Then you can quit for that day and start again the next day. · Pick your baby's most active time of day to count. This may be any time from morning to evening. · If you do not feel 10 movements in an hour, your baby may be sleeping. Wait for the next hour and count again. When should you call for help?   Call your doctor now or seek immediate medical care if:    · You noticed that your baby has stopped moving or is moving much less than normal.    Watch closely for changes in your health, and be sure to contact your doctor if you have any problems. Where can you learn more? Go to https://chpepiceweb.[employer]. org and sign in to your Network Game Interaction account. Enter O420 in the Cyber-Rain box to learn more about \"Counting Your Baby's Kicks: Care Instructions. \"     If you do not have an account, please click on the \"Sign Up Now\" link. Current as of: September 5, 2018  Content Version: 12.0  © 2516-5100 [employer]. Care instructions adapted under license by [facility] ([facility]). If you have questions about a medical condition or this instruction, always ask your healthcare professional. [address] any warranty or liability for your use of this information. Preeclampsia: Care Instructions  Overview    Preeclampsia occurs when a woman's blood pressure rises during pregnancy. Often with preeclampsia, you also have swelling in your legs, hands, and face. A test may show too much protein in your urine. Preeclampsia is also called toxemia. If preeclampsia is severe and not treated, it can lead to seizures (eclampsia) and damage to your liver or kidneys. Preeclampsia can prevent your baby from getting enough food and oxygen. This can cause a low birth weight or other problems. Your doctor will watch you closely to prevent these problems. He or she also may recommend that you reduce your activity. If your preeclampsia is a danger to your health or the health of your baby, your doctor may need to deliver your baby early. While preeclampsia is a concern, most women with preeclampsia have healthy babies. After a woman gives birth, preeclampsia usually goes away on its own. But symptoms may last a few weeks or more and can get worse after delivery. Rarely, symptoms of preeclampsia don't show up until days or even weeks after childbirth. Follow-up care is a key part of your treatment and safety.  Be sure to make and go to all appointments, and call your doctor if you are having problems. It's also a good idea to know your test results and keep a list of the medicines you take. How can you care for yourself at home? · Take and record your blood pressure at home if your doctor tells you to. ? Learn the importance of the two measures of blood pressure (such as 120 over 80, or 120/80). The first number is the systolic pressure, which is the force of blood on the artery walls as the heart pumps. The second number is the diastolic pressure, which is the force of blood on the artery walls between heartbeats, when the heart is at rest. You have a choice of monitors to use. ? Manual monitor: You pump up the cuff and use a stethoscope to listen for your pulse. ? Electronic monitor: The cuff inflates, and a gauge shows your pulse rate. ? To take your blood pressure:  ? Ask your doctor to check your blood pressure monitor to be sure that it is accurate and that the cuff fits you. Also ask your doctor to watch you to make sure that you are using it right. ? You should not eat, use tobacco products, or use medicine known to raise blood pressure (such as some nasal decongestant sprays) before you take your blood pressure. ? Avoid taking your blood pressure if you have just exercised. Also avoid taking it if you are nervous or upset. Rest at least 15 minutes before you take your blood pressure. · If your doctor advises, check the protein levels in your urine. Your doctor or nurse will show you how to do this. · Take your medicines exactly as prescribed. Call your doctor if you think you are having a problem with your medicine. · Do not smoke. Quitting smoking will help improve your baby's growth and health. If you need help quitting, talk to your doctor about stop-smoking programs and medicines. These can increase your chances of quitting for good.   · Eat a balanced and healthy diet that has lots of fruits and vegetables. · If your doctor advised bed rest, be sure to stay off your feet and rest as much as possible. ? Keep a phone, phone book, notepad, and pen near the bed where you can easily reach them. ? Gently stretch your legs every hour to maintain good blood flow. ? Have another family member pack snacks and lunch food in a cooler close to your bed. ? Use this time for activities that you usually cannot find time for, such as reading, craft projects, or letter writing. · You can keep track of your baby's health by noting the length of time it takes to count 10 movements (such as kicks, flutters, or rolls). Feeling 10 movements in less than 1 hour is considered normal. Track your baby's movements once each day. Bring this record with you to each prenatal visit. When should you call for help? Call 911 anytime you think you may need emergency care. For example, call if:    · You passed out (lost consciousness). · You have a seizure. Call your doctor now or seek immediate medical care if:    · You have symptoms of preeclampsia, such as:  ? Sudden swelling of your face, hands, or feet. ? New vision problems (such as dimness or blurring). ? A severe headache. · Your blood pressure is higher than it should be, or it rises suddenly. · You have new nausea or vomiting. · You think that you are in labor. · You have pain in your belly or pelvis. Watch closely for changes in your health, and be sure to contact your doctor if:    · You gain weight rapidly. Where can you learn more? Go to https://[employer]ianne[employer]acle Pharm[employer] org and sign in to your Alignment Acquisitions account. Enter A856 in the X[employer] box to learn more about \"Preeclampsia: Care Instructions. \"     If you do not have an account, please click on the \"Sign Up Now\" link. Current as of: September 5, 2018  Content Version: 12.0  © 1500-9752 Healthwise, Incorporated. Care instructions adapted under license by [facility] ([facility]).  If allow others to smoke around you. If you need help quitting, talk to your doctor about stop-smoking programs and medicines. These can increase your chances of quitting for good. When should you call for help? Call 911 anytime you think you may need emergency care. For example, call if:    · You passed out (lost consciousness). · You have severe vaginal bleeding. · You have severe pain in your belly or pelvis. · You have had fluid gushing or leaking from your vagina and you know or think the umbilical cord is bulging into your vagina. If this happens, immediately get down on your knees so your rear end (buttocks) is higher than your head. This will decrease the pressure on the cord until help arrives. Call your doctor now or seek immediate medical care if:    · You have signs of preeclampsia, such as:  ? Sudden swelling of your face, hands, or feet. ? New vision problems (such as dimness or blurring). ? A severe headache. · You have any vaginal bleeding. · You have belly pain or cramping. · You have a fever. · You have had regular contractions (with or without pain) for an hour. This means that you have 6 or more within 1 hour after you change your position and drink fluids. · You have a sudden release of fluid from the vagina. · You have low back pain or pelvic pressure that does not go away. · You notice that your baby has stopped moving or is moving much less than normal.    Watch closely for changes in your health, and be sure to contact your doctor if you have any problems. Where can you learn more? Go to https://[employer].dianneOneWed ([employer] Nearlyweds[employer]. org and sign in to your Cimagine Media account. Enter Q400 in the TATE'S LIST box to learn more about \" Labor: Care Instructions. \"     If you do not have an account, please click on the \"Sign Up Now\" link. Current as of: 2018  Content Version: 12.0  © 2433-4327 Healthwise, [facility].  Care instructions adapted under license by [facility] ([facility]). If you have questions about a medical condition or this instruction, always ask your healthcare professional. [address] any warranty or liability for your use of this information.

## 2022-02-03 NOTE — PROGRESS NOTES
SUBJECTIVE:    Edda Thmopson is here for her return OB visit. She declines concerns at this time. She reports feeling fetal movement. She denies vaginal bleeding. She denies vaginal discharge. She denies leaking of fluid. She denies uterine cramping. She denies  nausea and/or vomiting. OBJECTIVE:  Blood pressure 114/64, weight 185 lb 7 oz (84.1 kg), last menstrual period 2021, not currently breastfeeding. Edda Thompson has not received the flu vaccine as appropriate. Desires at this visit. Edda Thompson has received the Tdap vaccine as appropriate  Edda Thompson has not received the COVID vaccine as appropriate. Does not desires. ASSESSMENT/PLAN:    1. High risk pregnancy, antepartum  IUP @ 34 weeks  S = D    2. 34 weeks gestation of pregnancy  - Reviewed Fetal kick counts  - Discussed signs and symptoms of  labor versus prince [place]on contractions. - Reviewed signs and symptoms of preeclampsia. - Flu vaccine administered at this visit. 3. Family history of cleft palate  - Ultrasound on  to assess for cleft lip/palate due to family history. Reviewed importance of attending ultrasound. 4. Elv 1hr GTT, no 3hr GTT  - Reinforced importance of 3 hour glucose screening. Reports has been taking blood sugars at home and they have been good, but does not have blood sugar log at this time. - Discussed getting 3 hour GTT completed ASAP and patient to do tomorrow. Unable to do today because her ride has to work. 5. Tobacco use  - Not smoking currently, stopped in second trimester. Positive reinforcement offered. 6. Rh negative state in antepartum period  - Rhogam received 22/. She was counseled regarding all of the above:    Return in about 2 weeks (around 2022) for return OB.     The patient, Edda Thompson was seen with a total time spent of 15 minutes for the visit on this date of service by the [facility]  Both face-to-face (counseling and education) and non face-to-face time (care coordination), were spent in determining the total time component.      Electronically Signed By: FIDEL Hammer CNM

## 2022-02-08 ENCOUNTER — ROUTINE PRENATAL (OUTPATIENT)
Dept: OBGYN CLINIC | Age: 22
End: 2022-02-08
Payer: MEDICAID

## 2022-02-08 VITALS
BODY MASS INDEX: 29.04 KG/M2 | SYSTOLIC BLOOD PRESSURE: 114 MMHG | DIASTOLIC BLOOD PRESSURE: 64 MMHG | WEIGHT: 185.44 LBS

## 2022-02-08 DIAGNOSIS — O99.810 ABNORMAL GLUCOSE TOLERANCE AFFECTING PREGNANCY, ANTEPARTUM: ICD-10-CM

## 2022-02-08 DIAGNOSIS — O09.90 HIGH RISK PREGNANCY, ANTEPARTUM: Primary | ICD-10-CM

## 2022-02-08 DIAGNOSIS — O26.899 RH NEGATIVE STATE IN ANTEPARTUM PERIOD: ICD-10-CM

## 2022-02-08 DIAGNOSIS — Z23 NEED FOR VACCINATION FOR H FLU TYPE B: ICD-10-CM

## 2022-02-08 DIAGNOSIS — Z82.79 FAMILY HISTORY OF CLEFT PALATE: ICD-10-CM

## 2022-02-08 DIAGNOSIS — Z67.91 RH NEGATIVE STATE IN ANTEPARTUM PERIOD: ICD-10-CM

## 2022-02-08 DIAGNOSIS — Z3A.34 34 WEEKS GESTATION OF PREGNANCY: ICD-10-CM

## 2022-02-08 DIAGNOSIS — Z72.0 TOBACCO USE: ICD-10-CM

## 2022-02-08 PROCEDURE — 99212 OFFICE O/P EST SF 10 MIN: CPT

## 2022-02-08 PROCEDURE — G0008 ADMIN INFLUENZA VIRUS VAC: HCPCS

## 2022-02-08 PROCEDURE — 90674 CCIIV4 VAC NO PRSV 0.5 ML IM: CPT

## 2022-02-09 PROBLEM — N39.0 UTI (URINARY TRACT INFECTION): Status: RESOLVED | Noted: 2022-01-10 | Resolved: 2022-02-09

## 2022-02-22 ENCOUNTER — ROUTINE PRENATAL (OUTPATIENT)
Dept: PERINATAL CARE | Age: 22
End: 2022-02-22
Payer: MEDICAID

## 2022-02-22 VITALS
RESPIRATION RATE: 16 BRPM | SYSTOLIC BLOOD PRESSURE: 125 MMHG | HEIGHT: 67 IN | WEIGHT: 194 LBS | DIASTOLIC BLOOD PRESSURE: 79 MMHG | TEMPERATURE: 97.2 F | HEART RATE: 76 BPM | BODY MASS INDEX: 30.45 KG/M2

## 2022-02-22 DIAGNOSIS — Z3A.36 36 WEEKS GESTATION OF PREGNANCY: ICD-10-CM

## 2022-02-22 DIAGNOSIS — O09.33 INSUFFICIENT PRENATAL CARE IN THIRD TRIMESTER: ICD-10-CM

## 2022-02-22 DIAGNOSIS — O35.2XX0 HEREDITARY FAMILIAL DISEASE AFFECTING MANAGEMENT OF MOTHER AND POSSIBLY AFFECTING FETUS, ANTEPARTUM, SINGLE OR UNSPECIFIED FETUS: Primary | ICD-10-CM

## 2022-02-22 DIAGNOSIS — O99.810 ABNORMAL MATERNAL GLUCOSE TOLERANCE, ANTEPARTUM: ICD-10-CM

## 2022-02-22 LAB
ABDOMINAL CIRCUMFERENCE: NORMAL
BIPARIETAL DIAMETER: NORMAL
ESTIMATED FETAL WEIGHT: NORMAL
FEMORAL DIAMETER: NORMAL
HC/AC: NORMAL
HEAD CIRCUMFERENCE: NORMAL

## 2022-02-22 PROCEDURE — 99999 PR OFFICE/OUTPT VISIT,PROCEDURE ONLY: CPT | Performed by: OBSTETRICS & GYNECOLOGY

## 2022-02-22 PROCEDURE — 76811 OB US DETAILED SNGL FETUS: CPT | Performed by: OBSTETRICS & GYNECOLOGY

## 2022-02-22 PROCEDURE — 76819 FETAL BIOPHYS PROFIL W/O NST: CPT | Performed by: OBSTETRICS & GYNECOLOGY

## 2022-02-22 NOTE — PROGRESS NOTES
Patient declined non-invasive prenatal testing/NIPT (cell-free DNA screening) that is offered to all pregnant patients irrespective of baseline risk or maternal age (Obstet [de-identified] 26; 80; ACOG Practice Bulletin Number 226, Screening for Fetal Chromosomal Abnormalities). Patient has declined non-invasive prenatal diagnosis testing (with [place]) today. Patient has declined maternal carrier testing ([facility] [address]). Advise 3-hour glucose tolerance test to evaluate for gestational diabetes (if not already performed). Referring OB provider declines Maternal-Fetal Medicine consultation at this time regarding the medical/obstetrical complications of pregnancy. Maternal-Fetal Medicine (MFM) attending physician will defer all management for the medical/obstetrical complications of pregnancy to the primary attending obstetrical physician, as a result. Therefore, only an ultrasound evaluation was completed today in the MFM office.

## 2022-02-24 ENCOUNTER — ROUTINE PRENATAL (OUTPATIENT)
Dept: OBGYN CLINIC | Age: 22
End: 2022-02-24

## 2022-02-24 ENCOUNTER — HOSPITAL ENCOUNTER (OUTPATIENT)
Age: 22
Setting detail: SPECIMEN
Discharge: HOME | End: 2022-02-24

## 2022-02-24 VITALS — WEIGHT: 194.7 LBS | SYSTOLIC BLOOD PRESSURE: 126 MMHG | BODY MASS INDEX: 30.49 KG/M2 | DIASTOLIC BLOOD PRESSURE: 84 MMHG

## 2022-02-24 DIAGNOSIS — Z3A.36 36 WEEKS GESTATION OF PREGNANCY: ICD-10-CM

## 2022-02-24 DIAGNOSIS — O09.30 LATE PRENATAL CARE: ICD-10-CM

## 2022-02-24 DIAGNOSIS — O99.810 ABNORMAL GLUCOSE TOLERANCE AFFECTING PREGNANCY, ANTEPARTUM: ICD-10-CM

## 2022-02-24 DIAGNOSIS — O09.90 HIGH RISK PREGNANCY, ANTEPARTUM: Primary | ICD-10-CM

## 2022-02-24 DIAGNOSIS — O26.899 RH NEGATIVE STATE IN ANTEPARTUM PERIOD: ICD-10-CM

## 2022-02-24 DIAGNOSIS — Z67.91 RH NEGATIVE STATE IN ANTEPARTUM PERIOD: ICD-10-CM

## 2022-02-24 DIAGNOSIS — Z86.32 HISTORY OF GESTATIONAL DIABETES: ICD-10-CM

## 2022-02-24 PROCEDURE — 0502F SUBSEQUENT PRENATAL CARE: CPT | Performed by: ADVANCED PRACTICE MIDWIFE

## 2022-02-24 NOTE — PROGRESS NOTES
SUBJECTIVE:    Sebastien Hutton is here for her return OB visit. Arrives with mother and sister. Reports been busy and has been hard to get into care because of that. Has been checking blood sugars and reports fastings are under 90 and 2 hour postprandial are under 120. Desires to continue checking for duration of pregnancy and will bring in log at next office visit. She reports  feeling fetal movement. She denies vaginal bleeding. She denies vaginal discharge. She denies leaking of fluid. She denies uterine cramping. She denies  nausea and/or vomiting. OBJECTIVE:  Blood pressure 126/84, weight 194 lb 11.2 oz (88.3 kg), last menstrual period 2021, not currently breastfeeding. Sebastien Hutton has received the flu vaccine as appropriate. Sebastien Hutton has received the Tdap vaccine as appropriate  Sebastien Hutton has not received the COVID vaccine as appropriate. DECLINED    ASSESSMENT/PLAN:  IUP @ 36+3 weeks  S =D    High Risk Pregnancy   Due date is based on LMP    Patient's prenatal labs are completed. Patient's blood type A negative and rhogam is indicated in pregnancy. Received 22   Patient declined genetic screening   Anatomy scan completed 22. Placenta anterior, normal cord insertion, NO low lying/previa noted. Follow up as clinically indicated. BPD 46%, HC 36%, AC 91%, FL 47%, EFW 61%   Glucola between 24-28 weeks. Elevated 149, 3 hour ordered. Declines. Desires to check blood sugars. Reviewed fasting and 2 hour postprandial     2. 36 weeks gestation of pregnancy  - Reviewed and reinforced fetal kick counts  - Culture, Strep B Screen, Vaginal/Rectal; Future  - Reviewed signs and symptoms of  labor, preeclampsia and prince hick contractions  - Reviewed birth preferences     3. Late prenatal care  - Reviewed importance of prenatal care    4. Elv 1hr GTT, no 3hr GTT    5. Rh Negative   - Received rhogam as appropriate     6.  Hx GDMA1 (G1)          She was counseled regarding all of the above:    Return in about 1 week (around 3/3/2022) for Return OB. The patient, Jaida Mckoy was seen with a total time spent of 25 minutes for the visit on this date of service by the [facility]  Both face-to-face (counseling and education) and non face-to-face time (care coordination), were spent in determining the total time component.      Electronically Signed By: Wendy Auguste

## 2022-02-27 LAB
CULTURE: NORMAL
SPECIMEN DESCRIPTION: NORMAL

## 2022-03-03 ENCOUNTER — ROUTINE PRENATAL (OUTPATIENT)
Dept: OBGYN CLINIC | Age: 22
End: 2022-03-03
Payer: MEDICAID

## 2022-03-03 ENCOUNTER — HOSPITAL ENCOUNTER (INPATIENT)
Age: 22
LOS: 3 days | Discharge: HOME | DRG: 560 | End: 2022-03-06
Attending: OBSTETRICS & GYNECOLOGY | Admitting: OBSTETRICS & GYNECOLOGY
Payer: MEDICAID

## 2022-03-03 VITALS — BODY MASS INDEX: 30.85 KG/M2 | WEIGHT: 197 LBS | DIASTOLIC BLOOD PRESSURE: 72 MMHG | SYSTOLIC BLOOD PRESSURE: 128 MMHG

## 2022-03-03 DIAGNOSIS — Z3A.37 37 WEEKS GESTATION OF PREGNANCY: Primary | ICD-10-CM

## 2022-03-03 LAB
ABO/RH: NORMAL
ABSOLUTE EOS #: 0.07 K/UL (ref 0–0.44)
ABSOLUTE IMMATURE GRANULOCYTE: 0.12 K/UL (ref 0–0.3)
ABSOLUTE LYMPH #: 1.89 K/UL (ref 1.1–3.7)
ABSOLUTE MONO #: 1.13 K/UL (ref 0.1–1.4)
ALBUMIN SERPL-MCNC: 3.1 G/DL (ref 3.5–5.2)
ALBUMIN/GLOBULIN RATIO: 0.9 (ref 1–2.5)
ALP BLD-CCNC: 137 U/L (ref 35–104)
ALT SERPL-CCNC: 5 U/L (ref 5–33)
AMPHETAMINE SCREEN URINE: NEGATIVE
ANION GAP SERPL CALCULATED.3IONS-SCNC: 11 MMOL/L (ref 9–17)
ANTIBODY IDENTIFICATION: NORMAL
ANTIBODY SCREEN: POSITIVE
ARM BAND NUMBER: NORMAL
AST SERPL-CCNC: 17 U/L
BARBITURATE SCREEN URINE: NEGATIVE
BASOPHILS # BLD: 0 % (ref 0–2)
BASOPHILS ABSOLUTE: 0.04 K/UL (ref 0–0.2)
BENZODIAZEPINE SCREEN, URINE: NEGATIVE
BILIRUB SERPL-MCNC: <0.1 MG/DL (ref 0.3–1.2)
BUN BLDV-MCNC: 8 MG/DL (ref 6–20)
CALCIUM SERPL-MCNC: 9.4 MG/DL (ref 8.6–10.4)
CANNABINOID SCREEN URINE: NEGATIVE
CHLORIDE BLD-SCNC: 101 MMOL/L (ref 98–107)
CO2: 20 MMOL/L (ref 20–31)
COCAINE METABOLITE, URINE: NEGATIVE
CREAT SERPL-MCNC: 0.49 MG/DL (ref 0.5–0.9)
CREATININE URINE: 101.8 MG/DL (ref 28–217)
EOSINOPHILS RELATIVE PERCENT: 1 % (ref 1–4)
EXPIRATION DATE: NORMAL
GFR AFRICAN AMERICAN: >60 ML/MIN
GFR NON-AFRICAN AMERICAN: >60 ML/MIN
GFR SERPL CREATININE-BSD FRML MDRD: ABNORMAL ML/MIN/{1.73_M2}
GLUCOSE BLD-MCNC: 89 MG/DL (ref 70–99)
HCT VFR BLD CALC: 31.8 % (ref 36.3–47.1)
HEMOGLOBIN: 10 G/DL (ref 11.9–15.1)
IMMATURE GRANULOCYTES: 1 %
LYMPHOCYTES # BLD: 16 % (ref 25–45)
MCH RBC QN AUTO: 24.8 PG (ref 25.2–33.5)
MCHC RBC AUTO-ENTMCNC: 31.4 G/DL (ref 28.4–34.8)
MCV RBC AUTO: 78.9 FL (ref 82.6–102.9)
METHADONE SCREEN, URINE: NEGATIVE
MONOCYTES # BLD: 9 % (ref 2–8)
NRBC AUTOMATED: 0.2 PER 100 WBC
OPIATES, URINE: NEGATIVE
OXYCODONE SCREEN URINE: NEGATIVE
PDW BLD-RTO: 14.1 % (ref 11.8–14.4)
PHENCYCLIDINE, URINE: NEGATIVE
PLATELET # BLD: 277 K/UL (ref 138–453)
PMV BLD AUTO: 8.9 FL (ref 8.1–13.5)
POTASSIUM SERPL-SCNC: 4.1 MMOL/L (ref 3.7–5.3)
RBC # BLD: 4.03 M/UL (ref 3.95–5.11)
RBC # BLD: ABNORMAL 10*6/UL
SARS-COV-2, RAPID: NOT DETECTED
SEG NEUTROPHILS: 73 % (ref 34–64)
SEGMENTED NEUTROPHILS ABSOLUTE COUNT: 8.9 K/UL (ref 1.5–8.1)
SODIUM BLD-SCNC: 132 MMOL/L (ref 135–144)
SPECIMEN DESCRIPTION: NORMAL
T. PALLIDUM, IGG: NONREACTIVE
TEST INFORMATION: NORMAL
TOTAL PROTEIN, URINE: 74 MG/DL
TOTAL PROTEIN: 6.4 G/DL (ref 6.4–8.3)
URINE TOTAL PROTEIN CREATININE RATIO: 0.73 (ref 0–0.2)
WBC # BLD: 12.2 K/UL (ref 4.5–13.5)

## 2022-03-03 PROCEDURE — 84156 ASSAY OF PROTEIN URINE: CPT

## 2022-03-03 PROCEDURE — 86850 RBC ANTIBODY SCREEN: CPT

## 2022-03-03 PROCEDURE — 80053 COMPREHEN METABOLIC PANEL: CPT

## 2022-03-03 PROCEDURE — 93005 ELECTROCARDIOGRAM TRACING: CPT | Performed by: STUDENT IN AN ORGANIZED HEALTH CARE EDUCATION/TRAINING PROGRAM

## 2022-03-03 PROCEDURE — 85025 COMPLETE CBC W/AUTO DIFF WBC: CPT

## 2022-03-03 PROCEDURE — 1220000000 HC SEMI PRIVATE OB R&B

## 2022-03-03 PROCEDURE — 6360000002 HC RX W HCPCS: Performed by: STUDENT IN AN ORGANIZED HEALTH CARE EDUCATION/TRAINING PROGRAM

## 2022-03-03 PROCEDURE — 82570 ASSAY OF URINE CREATININE: CPT

## 2022-03-03 PROCEDURE — 1036F TOBACCO NON-USER: CPT | Performed by: NURSE PRACTITIONER

## 2022-03-03 PROCEDURE — 86870 RBC ANTIBODY IDENTIFICATION: CPT

## 2022-03-03 PROCEDURE — 86900 BLOOD TYPING SEROLOGIC ABO: CPT

## 2022-03-03 PROCEDURE — G8427 DOCREV CUR MEDS BY ELIG CLIN: HCPCS | Performed by: NURSE PRACTITIONER

## 2022-03-03 PROCEDURE — 2580000003 HC RX 258: Performed by: STUDENT IN AN ORGANIZED HEALTH CARE EDUCATION/TRAINING PROGRAM

## 2022-03-03 PROCEDURE — 99213 OFFICE O/P EST LOW 20 MIN: CPT | Performed by: NURSE PRACTITIONER

## 2022-03-03 PROCEDURE — G8482 FLU IMMUNIZE ORDER/ADMIN: HCPCS | Performed by: NURSE PRACTITIONER

## 2022-03-03 PROCEDURE — G8419 CALC BMI OUT NRM PARAM NOF/U: HCPCS | Performed by: NURSE PRACTITIONER

## 2022-03-03 PROCEDURE — 84443 ASSAY THYROID STIM HORMONE: CPT

## 2022-03-03 PROCEDURE — 86780 TREPONEMA PALLIDUM: CPT

## 2022-03-03 PROCEDURE — 80307 DRUG TEST PRSMV CHEM ANLYZR: CPT

## 2022-03-03 PROCEDURE — 86901 BLOOD TYPING SEROLOGIC RH(D): CPT

## 2022-03-03 PROCEDURE — 6370000000 HC RX 637 (ALT 250 FOR IP): Performed by: STUDENT IN AN ORGANIZED HEALTH CARE EDUCATION/TRAINING PROGRAM

## 2022-03-03 PROCEDURE — 87635 SARS-COV-2 COVID-19 AMP PRB: CPT

## 2022-03-03 RX ORDER — SODIUM CHLORIDE 0.9 % (FLUSH) 0.9 %
5-40 SYRINGE (ML) INJECTION EVERY 12 HOURS SCHEDULED
Status: DISCONTINUED | OUTPATIENT
Start: 2022-03-03 | End: 2022-03-04

## 2022-03-03 RX ORDER — DIPHENHYDRAMINE HCL 25 MG
25 TABLET ORAL EVERY 4 HOURS PRN
Status: DISCONTINUED | OUTPATIENT
Start: 2022-03-03 | End: 2022-03-04

## 2022-03-03 RX ORDER — SODIUM CHLORIDE, SODIUM LACTATE, POTASSIUM CHLORIDE, CALCIUM CHLORIDE 600; 310; 30; 20 MG/100ML; MG/100ML; MG/100ML; MG/100ML
INJECTION, SOLUTION INTRAVENOUS CONTINUOUS
Status: DISCONTINUED | OUTPATIENT
Start: 2022-03-03 | End: 2022-03-04

## 2022-03-03 RX ORDER — ONDANSETRON 2 MG/ML
4 INJECTION INTRAMUSCULAR; INTRAVENOUS EVERY 6 HOURS PRN
Status: DISCONTINUED | OUTPATIENT
Start: 2022-03-03 | End: 2022-03-04

## 2022-03-03 RX ORDER — LIDOCAINE HYDROCHLORIDE 10 MG/ML
30 INJECTION, SOLUTION EPIDURAL; INFILTRATION; INTRACAUDAL; PERINEURAL PRN
Status: DISCONTINUED | OUTPATIENT
Start: 2022-03-03 | End: 2022-03-04

## 2022-03-03 RX ORDER — SODIUM CHLORIDE, SODIUM LACTATE, POTASSIUM CHLORIDE, AND CALCIUM CHLORIDE .6; .31; .03; .02 G/100ML; G/100ML; G/100ML; G/100ML
1000 INJECTION, SOLUTION INTRAVENOUS PRN
Status: DISCONTINUED | OUTPATIENT
Start: 2022-03-03 | End: 2022-03-04

## 2022-03-03 RX ORDER — ACETAMINOPHEN 500 MG
1000 TABLET ORAL EVERY 6 HOURS PRN
Status: DISCONTINUED | OUTPATIENT
Start: 2022-03-03 | End: 2022-03-04

## 2022-03-03 RX ORDER — CALCIUM CARBONATE 200(500)MG
1000 TABLET,CHEWABLE ORAL 3 TIMES DAILY PRN
Status: DISCONTINUED | OUTPATIENT
Start: 2022-03-03 | End: 2022-03-06 | Stop reason: HOSPADM

## 2022-03-03 RX ORDER — SODIUM CHLORIDE 9 MG/ML
25 INJECTION, SOLUTION INTRAVENOUS PRN
Status: DISCONTINUED | OUTPATIENT
Start: 2022-03-03 | End: 2022-03-04

## 2022-03-03 RX ORDER — SODIUM CHLORIDE, SODIUM LACTATE, POTASSIUM CHLORIDE, AND CALCIUM CHLORIDE .6; .31; .03; .02 G/100ML; G/100ML; G/100ML; G/100ML
500 INJECTION, SOLUTION INTRAVENOUS PRN
Status: DISCONTINUED | OUTPATIENT
Start: 2022-03-03 | End: 2022-03-04

## 2022-03-03 RX ORDER — SODIUM CHLORIDE 0.9 % (FLUSH) 0.9 %
5-40 SYRINGE (ML) INJECTION PRN
Status: DISCONTINUED | OUTPATIENT
Start: 2022-03-03 | End: 2022-03-04

## 2022-03-03 RX ADMIN — ACETAMINOPHEN 1000 MG: 500 TABLET ORAL at 21:58

## 2022-03-03 RX ADMIN — ONDANSETRON 4 MG: 2 INJECTION INTRAMUSCULAR; INTRAVENOUS at 21:46

## 2022-03-03 RX ADMIN — ANTACID TABLETS 1000 MG: 500 TABLET, CHEWABLE ORAL at 22:32

## 2022-03-03 RX ADMIN — SODIUM CHLORIDE, POTASSIUM CHLORIDE, SODIUM LACTATE AND CALCIUM CHLORIDE: 600; 310; 30; 20 INJECTION, SOLUTION INTRAVENOUS at 21:07

## 2022-03-03 RX ADMIN — SODIUM CHLORIDE, SODIUM LACTATE, POTASSIUM CHLORIDE, AND CALCIUM CHLORIDE 500 ML: .6; .31; .03; .02 INJECTION, SOLUTION INTRAVENOUS at 23:43

## 2022-03-03 ASSESSMENT — PAIN SCALES - GENERAL
PAINLEVEL_OUTOF10: 0
PAINLEVEL_OUTOF10: 5

## 2022-03-03 NOTE — LETTER
Monserrat Marte was in our facility 3/3/22 to deliver her baby. Her mother was with her as her support person. Please don't hesitate to reach out with any questions. Thank you.       Jignesh Feng RN

## 2022-03-03 NOTE — PROGRESS NOTES
Presents for OB visit  Gestation 37w3d  Estimated Date of Delivery: 3/21/22    Transfer of care at 34 weeks gestation  Family hx of cleft lip/palate  Failed 1 hr GTT: 149  Scheduled for 3 hr GTT  Scheduled with MFM for facial views US follow up from anatomy- Previous OB [facility] contacted and stated no anatomy US on file stating they only saw her once- US scheduled MFM 22  Hx GDM diet controlled last pregnancy  Rhogam and tdap injection given- 22- consent signed  RH negative   Knows gender-boy  Flu given 22  GBS negative                   OB History    Para Term  AB Living   2 1 1     1   SAB IAB Ectopic Molar Multiple Live Births             1      # Outcome Date GA Lbr Saul/2nd Weight Sex Delivery Anes PTL Lv   2 Current            1 Term 19 38w5d  8 lb 4 oz (3.742 kg) M Vag-Spont   JOSUÉ            No Known Allergies  Current Outpatient Medications   Medication Sig Dispense Refill    Calcium Carbonate Antacid (TUMS PO) Take by mouth      Prenatal Vit-Fe Fumarate-FA (PRENATAL PO) Take by mouth       No current facility-administered medications for this visit. Past Medical History:   Diagnosis Date    ADHD (attention deficit hyperactivity disorder)     Anxiety     Urinary reflux        Past Surgical History:   Procedure Laterality Date    TONSILLECTOMY      TYMPANOSTOMY TUBE PLACEMENT       Headaches: no  Swelling: no  Bleeding: no  Discharge: no   Dysuria: no  Nausea: no  Heartburn: no  Epigastric pain: no  Contractions: no  Leakage of fluid: no  + fetal movement       Return 1 WEEK    Labs as indicated n/a    PTL signs reviewed, if indicated  Kick Count Instructions given if indicated: The patient was instructed on fetal kick counts and was given a kick sheet to complete every 8 hours. This is to begin at 28 weeks gestation.  She was instructed that the baby should move at a minimum of ten times within one hour after a meal. The patient was instructed to lay down on her left side twenty minutes after eating and count movements for up to one hour with a target value of ten movements. She was instructed to notify the office if she did not make that target after two attempts or if after any attempt there was less than four movements. After hour numbers reviewed , along with labor signs if indicated. Contractions/cramping between 5-7 minutes and persisting even with attempts of increased water and laying on side. Fluid leakage, bleeding  NST information given no  The patient was counseled on Labor & Delivery. Route of delivery and counseling on vaginal, operative vaginal, and  sections were completed with the risks of each to both the patient as well as her baby. The possibility of a blood transfusion was discussed as well. The patient was not opposed to receiving a transfusion if needed. The patient was counseled on types of analgesia during labor and is considering either Regional or IV medication the risks, benefits and alternatives were discussed. The patient was counseled on the mandatory call ahead policy. She has been instructed to call the office at anytime prior to going into the hospital so the on-call physician may direct her to the appropriate facility for care. Exceptions were reviewed including but not limited to: Decreased fetal movement, vaginal Bleeding or hemorrhage, trauma, readily expectant delivery, or any instance where she feels 911 should be utilized. Of the 20 minute duration appointment visit, Jacqueline Morse CNP spent at least 50% of the face-to-face time in counseling, explanation of diagnosis, planning of further management, and answering all questions.

## 2022-03-03 NOTE — PATIENT INSTRUCTIONS

## 2022-03-04 ENCOUNTER — ANESTHESIA EVENT (OUTPATIENT)
Dept: LABOR AND DELIVERY | Age: 22
DRG: 560 | End: 2022-03-04
Payer: MEDICAID

## 2022-03-04 ENCOUNTER — ANESTHESIA (OUTPATIENT)
Dept: LABOR AND DELIVERY | Age: 22
DRG: 560 | End: 2022-03-04
Payer: MEDICAID

## 2022-03-04 PROBLEM — O14.90 PREECLAMPSIA: Status: ACTIVE | Noted: 2022-03-04

## 2022-03-04 LAB
EKG ATRIAL RATE: 68 BPM
EKG P AXIS: 43 DEGREES
EKG P-R INTERVAL: 134 MS
EKG Q-T INTERVAL: 346 MS
EKG QRS DURATION: 72 MS
EKG QTC CALCULATION (BAZETT): 367 MS
EKG R AXIS: 15 DEGREES
EKG T AXIS: 36 DEGREES
EKG VENTRICULAR RATE: 68 BPM
GLUCOSE BLD-MCNC: 82 MG/DL (ref 65–105)
TSH SERPL DL<=0.05 MIU/L-ACNC: 1.59 MIU/L (ref 0.3–5)

## 2022-03-04 PROCEDURE — 6370000000 HC RX 637 (ALT 250 FOR IP): Performed by: STUDENT IN AN ORGANIZED HEALTH CARE EDUCATION/TRAINING PROGRAM

## 2022-03-04 PROCEDURE — 2580000003 HC RX 258: Performed by: STUDENT IN AN ORGANIZED HEALTH CARE EDUCATION/TRAINING PROGRAM

## 2022-03-04 PROCEDURE — 96374 THER/PROPH/DIAG INJ IV PUSH: CPT

## 2022-03-04 PROCEDURE — 0HQ9XZZ REPAIR PERINEUM SKIN, EXTERNAL APPROACH: ICD-10-PCS | Performed by: OBSTETRICS & GYNECOLOGY

## 2022-03-04 PROCEDURE — 6360000002 HC RX W HCPCS: Performed by: STUDENT IN AN ORGANIZED HEALTH CARE EDUCATION/TRAINING PROGRAM

## 2022-03-04 PROCEDURE — 3700000025 EPIDURAL BLOCK: Performed by: ANESTHESIOLOGY

## 2022-03-04 PROCEDURE — 10907ZC DRAINAGE OF AMNIOTIC FLUID, THERAPEUTIC FROM PRODUCTS OF CONCEPTION, VIA NATURAL OR ARTIFICIAL OPENING: ICD-10-PCS | Performed by: OBSTETRICS & GYNECOLOGY

## 2022-03-04 PROCEDURE — 2500000003 HC RX 250 WO HCPCS: Performed by: NURSE ANESTHETIST, CERTIFIED REGISTERED

## 2022-03-04 PROCEDURE — 7200000001 HC VAGINAL DELIVERY

## 2022-03-04 PROCEDURE — 82947 ASSAY GLUCOSE BLOOD QUANT: CPT

## 2022-03-04 PROCEDURE — 88307 TISSUE EXAM BY PATHOLOGIST: CPT

## 2022-03-04 PROCEDURE — 6360000002 HC RX W HCPCS

## 2022-03-04 PROCEDURE — 1220000000 HC SEMI PRIVATE OB R&B

## 2022-03-04 PROCEDURE — 6360000002 HC RX W HCPCS: Performed by: NURSE ANESTHETIST, CERTIFIED REGISTERED

## 2022-03-04 PROCEDURE — 6360000002 HC RX W HCPCS: Performed by: ANESTHESIOLOGY

## 2022-03-04 RX ORDER — LIDOCAINE HYDROCHLORIDE 10 MG/ML
INJECTION, SOLUTION INFILTRATION; PERINEURAL PRN
Status: DISCONTINUED | OUTPATIENT
Start: 2022-03-04 | End: 2022-03-04 | Stop reason: SDUPTHER

## 2022-03-04 RX ORDER — NALBUPHINE HCL 10 MG/ML
5 AMPUL (ML) INJECTION EVERY 4 HOURS PRN
Status: DISCONTINUED | OUTPATIENT
Start: 2022-03-04 | End: 2022-03-04

## 2022-03-04 RX ORDER — HYDROCORTISONE 25 MG/G
CREAM TOPICAL
Status: DISCONTINUED | OUTPATIENT
Start: 2022-03-04 | End: 2022-03-06 | Stop reason: HOSPADM

## 2022-03-04 RX ORDER — ROPIVACAINE HYDROCHLORIDE 2 MG/ML
INJECTION, SOLUTION EPIDURAL; INFILTRATION; PERINEURAL PRN
Status: DISCONTINUED | OUTPATIENT
Start: 2022-03-04 | End: 2022-03-04 | Stop reason: SDUPTHER

## 2022-03-04 RX ORDER — ONDANSETRON 2 MG/ML
4 INJECTION INTRAMUSCULAR; INTRAVENOUS EVERY 6 HOURS PRN
Status: DISCONTINUED | OUTPATIENT
Start: 2022-03-04 | End: 2022-03-04

## 2022-03-04 RX ORDER — SENNA AND DOCUSATE SODIUM 50; 8.6 MG/1; MG/1
2 TABLET, FILM COATED ORAL DAILY PRN
Status: DISCONTINUED | OUTPATIENT
Start: 2022-03-04 | End: 2022-03-06 | Stop reason: HOSPADM

## 2022-03-04 RX ORDER — NALOXONE HYDROCHLORIDE 0.4 MG/ML
0.4 INJECTION, SOLUTION INTRAMUSCULAR; INTRAVENOUS; SUBCUTANEOUS PRN
Status: DISCONTINUED | OUTPATIENT
Start: 2022-03-04 | End: 2022-03-04

## 2022-03-04 RX ORDER — LANOLIN 72 %
OINTMENT (GRAM) TOPICAL PRN
Status: DISCONTINUED | OUTPATIENT
Start: 2022-03-04 | End: 2022-03-06 | Stop reason: HOSPADM

## 2022-03-04 RX ORDER — LIDOCAINE HYDROCHLORIDE AND EPINEPHRINE 15; 5 MG/ML; UG/ML
INJECTION, SOLUTION EPIDURAL PRN
Status: DISCONTINUED | OUTPATIENT
Start: 2022-03-04 | End: 2022-03-04 | Stop reason: SDUPTHER

## 2022-03-04 RX ORDER — SIMETHICONE 80 MG
80 TABLET,CHEWABLE ORAL EVERY 6 HOURS PRN
Status: DISCONTINUED | OUTPATIENT
Start: 2022-03-04 | End: 2022-03-06 | Stop reason: HOSPADM

## 2022-03-04 RX ORDER — ACETAMINOPHEN 500 MG
1000 TABLET ORAL EVERY 6 HOURS PRN
Status: DISCONTINUED | OUTPATIENT
Start: 2022-03-04 | End: 2022-03-06 | Stop reason: HOSPADM

## 2022-03-04 RX ORDER — ONDANSETRON 2 MG/ML
4 INJECTION INTRAMUSCULAR; INTRAVENOUS EVERY 4 HOURS PRN
Status: DISCONTINUED | OUTPATIENT
Start: 2022-03-04 | End: 2022-03-06 | Stop reason: HOSPADM

## 2022-03-04 RX ORDER — ROPIVACAINE HYDROCHLORIDE 2 MG/ML
INJECTION, SOLUTION EPIDURAL; INFILTRATION; PERINEURAL
Status: COMPLETED
Start: 2022-03-04 | End: 2022-03-04

## 2022-03-04 RX ORDER — IBUPROFEN 600 MG/1
600 TABLET ORAL EVERY 6 HOURS
Status: DISCONTINUED | OUTPATIENT
Start: 2022-03-04 | End: 2022-03-06 | Stop reason: HOSPADM

## 2022-03-04 RX ADMIN — ROPIVACAINE HYDROCHLORIDE 3 ML: 2 INJECTION, SOLUTION EPIDURAL; INFILTRATION at 08:38

## 2022-03-04 RX ADMIN — Medication 87.3 MILLI-UNITS/MIN: at 18:12

## 2022-03-04 RX ADMIN — SODIUM CHLORIDE, POTASSIUM CHLORIDE, SODIUM LACTATE AND CALCIUM CHLORIDE: 600; 310; 30; 20 INJECTION, SOLUTION INTRAVENOUS at 05:48

## 2022-03-04 RX ADMIN — ROPIVACAINE HYDROCHLORIDE 2 ML: 2 INJECTION, SOLUTION EPIDURAL; INFILTRATION at 08:41

## 2022-03-04 RX ADMIN — Medication 500 ML: at 18:13

## 2022-03-04 RX ADMIN — LIDOCAINE HYDROCHLORIDE 3 ML: 10 INJECTION, SOLUTION INFILTRATION; PERINEURAL at 08:31

## 2022-03-04 RX ADMIN — IBUPROFEN 600 MG: 600 TABLET, FILM COATED ORAL at 20:06

## 2022-03-04 RX ADMIN — SODIUM CHLORIDE, POTASSIUM CHLORIDE, SODIUM LACTATE AND CALCIUM CHLORIDE: 600; 310; 30; 20 INJECTION, SOLUTION INTRAVENOUS at 00:57

## 2022-03-04 RX ADMIN — Medication 10 ML/HR: at 15:11

## 2022-03-04 RX ADMIN — LIDOCAINE HYDROCHLORIDE,EPINEPHRINE BITARTRATE 3 ML: 15; .005 INJECTION, SOLUTION EPIDURAL; INFILTRATION; INTRACAUDAL; PERINEURAL at 08:35

## 2022-03-04 RX ADMIN — SODIUM CHLORIDE, SODIUM LACTATE, POTASSIUM CHLORIDE, AND CALCIUM CHLORIDE 500 ML: .6; .31; .03; .02 INJECTION, SOLUTION INTRAVENOUS at 02:43

## 2022-03-04 RX ADMIN — SODIUM CHLORIDE, SODIUM LACTATE, POTASSIUM CHLORIDE, AND CALCIUM CHLORIDE 1000 ML: .6; .31; .03; .02 INJECTION, SOLUTION INTRAVENOUS at 08:12

## 2022-03-04 RX ADMIN — Medication 1 MILLI-UNITS/MIN: at 04:16

## 2022-03-04 RX ADMIN — SODIUM CHLORIDE, POTASSIUM CHLORIDE, SODIUM LACTATE AND CALCIUM CHLORIDE: 600; 310; 30; 20 INJECTION, SOLUTION INTRAVENOUS at 15:15

## 2022-03-04 RX ADMIN — Medication 10 ML/HR: at 08:43

## 2022-03-04 RX ADMIN — LIDOCAINE HYDROCHLORIDE,EPINEPHRINE BITARTRATE 2 ML: 15; .005 INJECTION, SOLUTION EPIDURAL; INFILTRATION; INTRACAUDAL; PERINEURAL at 08:36

## 2022-03-04 RX ADMIN — SODIUM CHLORIDE, POTASSIUM CHLORIDE, SODIUM LACTATE AND CALCIUM CHLORIDE: 600; 310; 30; 20 INJECTION, SOLUTION INTRAVENOUS at 08:59

## 2022-03-04 ASSESSMENT — PAIN DESCRIPTION - DESCRIPTORS: DESCRIPTORS: CRAMPING

## 2022-03-04 ASSESSMENT — PAIN SCALES - GENERAL: PAINLEVEL_OUTOF10: 1

## 2022-03-04 NOTE — H&P
OBSTETRICAL HISTORY [facility]    Date: 3/3/2022       Time: 10:43 PM   Patient Name: Freddie Nuñez     Patient : 2000  Room/Bed: 6406/8818-20    Admission Date/Time: 3/3/2022  7:30 PM      CC: Leakage of fluid and contractions    HPI: Freddie Nuñez is a 24 y.o.  at 37w3d who presents complaining of leakage of fluid around 6 PM denies any continued leakage, reports contractions. Patient denies any fever, chills, N/V, headaches, vision changes, chest pain, shortness of breath, RUQ pain. Patient denies any vaginal discharge and any urinary complaints. The patient reports fetal movement is present, complains of contractions, complains of loss of fluid, denies vaginal bleeding. DATING:  LMP: Patient's last menstrual period was 2021.   Estimated Date of Delivery: 3/21/22   Based on: LMP    PREGNANCY RISK FACTORS:  Patient Active Problem List   Diagnosis    FHx Cleft Palate    Elv 1hr GTT, no 3hr GTT    Hx GDMA1 (G1)    Tobacco use    Proteinuria    Late transfer of care    Rh Negative     High risk pregnancy, antepartum    Late prenatal care    37 weeks gestation of pregnancy        Steroids Given In This Pregnancy:  no     REVIEW OF SYSTEMS:   Constitutional: negative fever, negative chills  HEENT: negative visual disturbances, negative headaches  Respiratory: negative dyspnea, negative cough  Cardiovascular: negative chest pain,  negative palpitations  Gastrointestinal: positive abd pain 2/2 contractions, negative RUQ pain, negative N/V, negative diarrhea, negative constipation  Genitourinary: negative dysuria, negative vaginal discharge, negative vaginal bleeding, leakage of fluid  Dermatological: negative rash  Hematologic: negative bruising  Immunologic/Lymphatic: negative recent illness, negative recent sick contact  Musculoskeletal: negative back pain, negative myalgias, negative arthralgias  Neurological:  negative dizziness, negative weakness  Behavior/Psych: negative depression, negative anxiety    OBSTETRICAL HISTORY:   OB History    Para Term  AB Living   2 1 1 0 0 1   SAB IAB Ectopic Molar Multiple Live Births   0 0 0 0 0 1      # Outcome Date GA Lbr Saul/2nd Weight Sex Delivery Anes PTL Lv   2 Current            1 Term 19 38w5d  8 lb 4 oz (3.742 kg) M Vag-Spont   JOSUÉ       PAST MEDICAL HISTORY:   has a past medical history of ADHD (attention deficit hyperactivity disorder), Anxiety, and Urinary reflux. PAST SURGICAL HISTORY:   has a past surgical history that includes Tonsillectomy and Tympanostomy tube placement. ALLERGIES:  has No Known Allergies. MEDICATIONS:  Prior to Admission medications    Medication Sig Start Date End Date Taking? Authorizing Provider   Prenatal Vit-Fe Fumarate-FA (PRENATAL PO) Take by mouth   Yes Historical Provider, MD   Calcium Carbonate Antacid (TUMS PO) Take by mouth    Historical Provider, MD       FAMILY HISTORY:  family history is not on file. SOCIAL HISTORY:   reports that she has never smoked. She has never used smokeless tobacco. She reports that she does not drink alcohol and does not use drugs.     VITALS:  Vitals:    22 1955 22 2207   BP: (!) 152/99 (!) 167/102 (!) 144/88 (!) 145/92   Pulse: 95 81 79 67   Resp: 20      Temp: 98.6 °F (37 °C)      TempSrc: Oral      SpO2: 100%      Weight: 197 lb (89.4 kg)      Height: 5' 7\" (1.702 m)            PHYSICAL EXAM:  Fetal Heart Monitor:  Baseline Heart Rate 135, moderate variability, present accelerations, absent decelerations  Atlantic: irregular contractions    General appearance:   alert, and cooperative  HEENT: head atraumatic, normocephalic, moist mucous membranes, trachea midline  Neurologic:  alert, oriented, normal speech, no focal findings or movement disorder noted  Lungs:  No increased work of breathing, good air exchange, clear to auscultation bilaterally, no crackles or wheezing  Heart:  regular rate and rhythm and no murmur    Abdomen:  soft, gravid, non-tender, no rebound, guarding, or rigidity, and no RUQ or epigastric tenderness  Extremities:  no calf tenderness, non edematous, DTR's: +2/4 bilateral lower extremities   Musculoskeletal: Gross strength equal and intact throughout, no gross abnormalities, range of motion normal in hips, knees, shoulders and spine, CVA tenderness: none  Psychiatric: Mood appropriate, normal affect   Rectal Exam: not indicated  Sterile Speculum Exam:   Urethral meatus: normal appearing   Vulva: Normal hair distribution, normal appearing vulva, no masses, tenderness or lesions, normal clitoris   Vagina: Normal appearing vaginal mucosa without lesions, white vaginal discharge noted in the posterior vault, no lacerations, no blood noted in posterior vaginal vault, no pooling of fluid noted   Cervix: Normal appearing cervix without lesions, external os visibly closed, no lacerations or abnormal lesions visualized, no blood noted from cervical os  Sterile Vaginal Exam:  Cervix: No cervical motion tenderness   Uterus: Is gravid, Normal size, shape, consistency and non-tender   Adnexa: Non-tender, no palpable masses  Cervix: 1 cm dilated, 30 % effaced, -3 station, mid position (out of 3 station), soft consistency, FETAL POSITION: Cephalic (confirmed by ultrasound), Membranes intact,        DATA:  Membranes Ruptured: No  Fern: negative  Nitrazine: Negative  Valsalva/Pooling: absent  Vaginal Bleeding: absent        OMM EXAM:  Chief Complaint: Pregnancy  Reason for No Exam (if applicable): not applicable  Anterior/ Posterior Spinal Curves: Lumbar Lordosis -  Slightly increased  Assessment Tool: T= Tenderness, A= Asymmetry, R= Restricted Motion (A=Active, P=Passive), T=Tissue Texture Changes  Region Evaluated : Severity / Specific of Major Somatic Dysfunction: M99.03 Lumbar -  Minor TART  Major Correlations with: Gravid  Structural Diagnosis: Lumbar lordosis slightly increased 2/2 pregnancy  Treatment Plan: Outpatient       LIMITED BEDSIDE US:  Position: Cephalic  Placental Location: anterior  Fetal Heart Tones: Present  Fetal Movement: Present  Amniotic Fluid Index/Volume: >2x2 cm MVP  Estimated Fetal Weight:  6 lbs 12oz (US )    PRENATAL LAB RESULTS:   Blood Type/Rh: A neg  Antibody Screen: negative  Hemoglobin, Hematocrit, Platelets: 21.2 / 01.5 / 295  Rubella: immune  T. Pallidum, IgG: negative   Hepatitis B Surface Antigen: negative   Hepatitis C antibody: non-reactive   HIV: non-reactive   Sickle Cell Screen: not available  Gonorrhea: negative  Chlamydia: negative  Urine culture: negative, date: 1/10/22    Early 1 hour Glucose Tolerance Test: 149  3 hour Glucose Tolerance Test: ordered, not completed    Group B Strep: negative  Cystic Fibrosis Screen: not available  First Trimester Screen: not available  MSAFP/Multiple Markers: not available  Non-Invasive Prenatal Testing: not available  Anatomy US: normal male anatomy, 3vc, normal cord insertion, anterior placenta    ASSESSMENT & PLAN:  Carmen Love is a 24 y.o. female  at 44w3d    - GBS negative / Rh negative / R immune   - No indication for GBS prophylaxis    Leakage of fluid/Contractions   - Patient complaining of leakage of fluid and contractions. - cEFM/TOCO - Cat 1 tracing, irregular contractions   - SSE: no pooling of clear fluid   - SVE: /3   - Nitrazine negative   - Ferning negative   - Low suspicion for rupture of membranes at this time. Will reevaluate in 2 hours for cervical change. Elevated blood pressures   -Blood pressure 152/99 on admission, repeat 167/102. Repeat after severe range 144/88.   -Denies signs or symptoms of preeclampsia   -Preeclampsia labs and P/C ordered   -Continue to monitor closely. Will repeat blood pressures every hour and evaluate if patient meets criteria for hypertensive disorder of pregnancy.       Elevated 1hr GTT   - 3hr GTT ordered, not completed   - CMP ordered      Hx GDMA1 (G1)       Proteinuria    - P/C 0.34 (1/20), ordered during admission for abdominal pain and nausea      Late PNC/IBAN    -Transfer of care from [address] [place] [address] OB at 29 weeks      FHx Cleft Palate    -Anatomy scan within normal limits      Tobacco use    -Encourage cessation      BMI 30.8    Patient Active Problem List    Diagnosis Date Noted    37 weeks gestation of pregnancy 03/03/2022    High risk pregnancy, antepartum 02/24/2022    Late prenatal care 02/24/2022    Rh Negative  02/08/2022     Rhogam received 1/20/22      FHx Cleft Palate 01/10/2022    Elv 1hr GTT, no 3hr GTT 01/10/2022    Hx GDMA1 (G1) 01/10/2022    Tobacco use 01/10/2022    Proteinuria 01/10/2022     P/C 0.34 on 1/10/22; BP normotensive, PreE labs wnl      Late transfer of care 01/10/2022     Transfer of care from [address] [place] [address] at 29 wks         Plan discussed with Dr. Kamlesh Reilly, who is agreeable. Steroids given this admission: No    Risks, benefits, alternatives and possible complications have been discussed in detail with the patient. Admission, and post admission procedures and expectations were discussed in detail. All questions were answered.     Attending's Name: Dr. Yonis Benitez DO  Ob/Gyn Resident  3/3/2022, 10:43 PM

## 2022-03-04 NOTE — FLOWSHEET NOTE
Patient admitted to room 704 per wheelchair accompanied by mother with c/o rupture of membranes and intermittent contractions. No bleeding per patient, + fetal movement.  EFM applied with heart tones of 140, Dr. Joe Hernandez notified of patients arrival.

## 2022-03-04 NOTE — PROGRESS NOTES
OB/GYN Resident Interval Note    Fetal bradycardia noted with baseline of 105, moderate variability and present accelerations. Maternal VSS. IVF bolus and position changes. EKG and TSH ordered. Will continue intrauterine resuscitation and continue to monitor closely.     Janelle Escalona, DO  OB/GYN Resident

## 2022-03-04 NOTE — PROGRESS NOTES
Labor Progress Note    Sabi Arguello is a 24 y.o. female  at 37w1d  The patient was seen and examined. Her pain is well controlled. She reports fetal movement is present, complains of contractions, denies loss of fluid, denies vaginal bleeding.        Vital Signs:  Vitals:    22 0856 22 0900 22 0916 22 0930   BP: 130/67 138/81 132/68 (!) 144/86   Pulse: 81 90 71 82   Resp:  18  18   Temp:       TempSrc:       SpO2:  98%  98%   Weight:       Height:       '  FHT: 120, moderate variability, accelerations present, decelerations absent  Contractions: irregular, every 2-3 minutes    Chaperone for Intimate Exam: Chaperone was present for entire exam, Chaperone Name: Janeth Cunningham RN  Cervical Exam: 2 cm dilated, 50 effaced, -1 station  Pitocin: @ 6 mu/min    Membranes: Ruptured clear fluid  Scalp Electrode in place: absent  Intrauterine Pressure Catheter in Place: absent    Interventions: SVE, AROM (clr)     Assessment/Plan:  Sabi Arguello is a 24 y.o. female  at 37w4d admitted for spontaneous labor   - GBS negative, No indication for GBS prophylaxis   - Afebrile   - cEFM/TOCO   - AROM (clr) @ 0915    - Continue pitocin per protocol    - Epidural for pain control   - Continue to monitor closely     Attending updated and in agreement with plan    Cheri Huang MD  Ob/Gyn Resident  3/4/2022, 9:46 AM

## 2022-03-04 NOTE — ANESTHESIA PRE PROCEDURE
Other PRN Sharon Prior, DO        ondansetron [facility]) injection 4 mg  4 mg IntraVENous Q6H PRN Sharon Prior, DO   4 mg at 03/03/22 2146    diphenhydrAMINE (BENADRYL) tablet 25 mg  25 mg Oral Q4H PRN Sharon Prior, DO        acetaminophen (TYLENOL) tablet 1,000 mg  1,000 mg Oral Q6H PRN Sharon Prior, DO   1,000 mg at 03/03/22 2158    benzocaine-menthol (DERMOPLAST) 20-0.5 % spray   Topical PRN Sharon Prior, DO        calcium carbonate (TUMS) chewable tablet 1,000 mg  1,000 mg Oral TID PRN Sharon Prior, DO   1,000 mg at 03/03/22 2232       Allergies:  No Known Allergies    Problem List:    Patient Active Problem List   Diagnosis Code    FHx Cleft Palate Z82.79    Elv 1hr GTT, no 3hr GTT O99.810    Hx GDMA1 (G1) Z86.32    Tobacco use Z72.0    Proteinuria R80.0    Late transfer of care Z76.89    Rh Negative  O26.899, Z67.91    High risk pregnancy, antepartum O56.80    Late prenatal care O09.30    37 weeks gestation of pregnancy Z3A.37       Past Medical History:        Diagnosis Date    ADHD (attention deficit hyperactivity disorder)     Anxiety     Urinary reflux        Past Surgical History:        Procedure Laterality Date    TONSILLECTOMY      TYMPANOSTOMY TUBE PLACEMENT         Social History:    Social History     Tobacco Use    Smoking status: Never Smoker    Smokeless tobacco: Never Used   Substance Use Topics    Alcohol use:  No                                Counseling given: Not Answered      Vital Signs (Current):   Vitals:    03/04/22 0500 03/04/22 0600 03/04/22 0700 03/04/22 0800   BP: 122/69 121/66 132/82 131/80   Pulse: 63 63 81 79   Resp:    18   Temp:    36.6 °C (97.9 °F)   TempSrc:    Oral   SpO2:       Weight:       Height:                                                  BP Readings from Last 3 Encounters:   03/04/22 131/80   03/03/22 128/72   02/24/22 126/84       NPO Status: BMI:   Wt Readings from Last 3 Encounters:   03/03/22 197 lb (89.4 kg)   03/03/22 197 lb (89.4 kg)   02/24/22 194 lb 11.2 oz (88.3 kg)     Body mass index is 30.85 kg/m².     CBC:   Lab Results   Component Value Date    WBC 12.2 03/03/2022    RBC 4.03 03/03/2022    HGB 10.0 03/03/2022    HCT 31.8 03/03/2022    MCV 78.9 03/03/2022    RDW 14.1 03/03/2022     03/03/2022       CMP:   Lab Results   Component Value Date     03/03/2022    K 4.1 03/03/2022     03/03/2022    CO2 20 03/03/2022    BUN 8 03/03/2022    CREATININE 0.49 03/03/2022    GFRAA >60 03/03/2022    LABGLOM >60 03/03/2022    GLUCOSE 89 03/03/2022    PROT 6.4 03/03/2022    CALCIUM 9.4 03/03/2022    BILITOT <0.10 03/03/2022    ALKPHOS 137 03/03/2022    AST 17 03/03/2022    ALT 5 03/03/2022       POC Tests:   Recent Labs     03/04/22  0345   POCGLU 82       Coags: No results found for: PROTIME, INR, APTT    HCG (If Applicable):   Lab Results   Component Value Date    PREGTESTUR NEGATIVE 05/19/2016        ABGs: No results found for: PHART, PO2ART, HWW6CZF, DOY9ANE, BEART, W2NKBWHK     Type & Screen (If Applicable):  No results found for: LABABO, LABRH    Drug/Infectious Status (If Applicable):  No results found for: HIV, HEPCAB    COVID-19 Screening (If Applicable):   Lab Results   Component Value Date    COVID19 Not Detected 03/03/2022           Anesthesia Evaluation  Patient summary reviewed and Nursing notes reviewed no history of anesthetic complications:   Airway: Mallampati: II  TM distance: >3 FB   Neck ROM: full  Mouth opening: > = 3 FB Dental: normal exam         Pulmonary:Negative Pulmonary ROS and normal exam                               Cardiovascular:  Exercise tolerance: good (>4 METS),           Rhythm: regular  Rate: normal           Beta Blocker:  Not on Beta Blocker         Neuro/Psych:   Negative Neuro/Psych ROS  (+) psychiatric history:            GI/Hepatic/Renal: Neg GI/Hepatic/Renal ROS            Endo/Other: Negative Endo/Other ROS                    Abdominal:       Abdomen: soft. Vascular: negative vascular ROS. Other Findings:             Anesthesia Plan      epidural     ASA 2             Anesthetic plan and risks discussed with patient. Use of blood products discussed with patient whom consented to blood products. Plan discussed with attending.                   FIDEL Marin - CRNA   3/4/2022

## 2022-03-04 NOTE — PROGRESS NOTES
Labor Progress Note    Micheal Tucker is a 24 y.o. female  at 37w1d  The patient was seen and examined. Her pain is well controlled. She reports fetal movement is present, complains of contractions, denies loss of fluid, denies vaginal bleeding.        Vital Signs:  Vitals:    22 0500 22 0600 22 0700 22 0800   BP: 122/69 121/66 132/82 131/80   Pulse: 63 63 81 79   Resp:    18   Temp:    97.9 °F (36.6 °C)   TempSrc:    Oral   SpO2:       Weight:       Height:         FHT: 120, moderate variability, accelerations present, decelerations absent  Contractions: irregular, every 2-4 minutes    Chaperone for Intimate Exam: NA  Cervical Exam: deferred  Pitocin: @ 6 mu/min    Membranes: Intact  Scalp Electrode in place: absent  Intrauterine Pressure Catheter in Place: absent    Interventions: none    Assessment/Plan:  Micheal Tucker is a 24 y.o. female  at 37w4d admitted for spontaneous labor   - GBS negative, No indication for GBS prophylaxis   - Afebrile   - cEFM/TOCO   - Continue pitocin per protocol   - Patient requesting Epidural. Epidural ordered   - Will plan for AROM after epidural    Attending updated and in agreement with plan    John Tam MD  Ob/Gyn Resident  3/4/2022, 8:19 AM

## 2022-03-04 NOTE — ANESTHESIA PROCEDURE NOTES
Epidural Block    Patient location during procedure: OB  Reason for block: labor epidural  Staffing  Performed: resident/CRNA   Anesthesiologist: Kj Brock MD  Resident/CRNA: FIDEL Ureña CRNA  Preanesthetic Checklist  Completed: patient identified, IV checked, risks and benefits discussed, monitors and equipment checked, pre-op evaluation, timeout performed, anesthesia consent given, oxygen available and patient being monitored  Epidural  Patient position: sitting  Prep: Betadine  Patient monitoring: continuous pulse ox and frequent blood pressure checks  Approach: midline  Location: lumbar (1-5)  Injection technique: JACKSON saline and JACKSON air  Provider prep: mask and sterile gloves  Needle  Needle type: Tuohy   Needle gauge: 17 G  Needle length: 3.5 in  Needle insertion depth: 5 cm  Catheter type: side hole  Catheter size: 19 G  Catheter at skin depth: 12 cm  Test dose: negative  Assessment  Sensory level: T10  Hemodynamics: stable  Attempts: 1

## 2022-03-04 NOTE — CARE COORDINATION
ANTEPARTUM NOTE    37 weeks gestation of pregnancy [Z3A.37]    Monserrat Marcelino was admitted to L&D on 3/3/2022 for IOL @ 37 2/7 weeks. OB GYN Provider: Dr. Glenard Aschoff    Will meet with patient after delivery to verify name/address/phone/insurance and discuss discharge planning. Anticipate DC home 2 nights after vaginal delivery or 4 nights after C/S delivery as long as hemodynamically stable.      Signature: Electronically signed by Pratik Schmidt RN on 8/5/75 at 7:23 AM EST

## 2022-03-04 NOTE — PROGRESS NOTES
Labor Progress Note    Trace Reynoso is a 24 y.o. female  at 37w1d  The patient was seen and examined. Her pain is well controlled. She reports fetal movement is present, complains of contractions, denies loss of fluid, denies vaginal bleeding.        Vital Signs:  Vitals:    22 0330 22 0400 22 0500 22 0600   BP: 127/80 (!) 142/92 122/69 121/66   Pulse: 81 79 63 63   Resp:       Temp:       TempSrc:       SpO2:       Weight:       Height:             FHT: 125, moderate variability, accelerations present, decelerations absent  Contractions: irregular contractions    Cervical Exam: deferred  Pitocin: @ 4 mu/min    Membranes: Intact  Scalp Electrode in place: absent  Intrauterine Pressure Catheter in Place: absent    Interventions: none    Assessment/Plan:  Trace Reynoso is a 24 y.o. female  at 37w1d admitted for spontaneous labor   - GBS negative, No indication for GBS prophylaxis   - VSS, afebrile   - cEFM/TOCO   - Continue pitocin per protocol   - Continue to monitor closely    Preeclampsia w/o SF's   - Blood pressures elevated, last severe range @ 2207 3/3/22   - PreE labs wnl, P/C 0.73   - Denies s/s preeclampsia    Senior resident updated and in agreement with plan    Shanna oGnzalez DO  Ob/Gyn Resident  3/4/2022, 6:04 AM

## 2022-03-04 NOTE — DISCHARGE SUMMARY
Obstetric Discharge Summary  [facility]    Patient Name: Chandrakant Silver  Patient : 2000  Primary Care Physician: Jaylon Covarrubias MD  Admit Date: 3/3/2022    Principal Diagnosis: IUP at 37w4d, admitted for spontaneous labor     Her pregnancy has been complicated by:   Patient Active Problem List   Diagnosis    FHx Cleft Palate    Elv 1hr GTT, no 3hr GTT    Hx GDMA1 (G1)    Tobacco use    Late transfer of care    Rh Negative     Late prenatal care     3/4/22 M Apg 8/9 Wt 7#3    Preeclampsia w/o SF (G2)    Encounter for care and examination of mother immediately after delivery       Infection Present?: No  Hospital Acquired: No    Surgical Operations & Procedures:  Analgesia: epidural  Delivery Type: Spontaneous Vaginal Delivery: See Labor and Delivery Summary   Laceration(s): First degree laceration. Suture used for repair:  Vicryl 3.0. Consultations: Anesthesia    Pertinent Findings & Procedures:   Chandrakant Silver is a 24 y.o. female  at 37w4d admitted for spontaneous labor. Due to periods of fetal bradycardia, BPP was performed 8/10, off for breathing. Patient then met criteria for preeclampsia without severe features. PreE labs wnl, P/C 0.73 and decision was made to augment labor with Pitocin, epidural, AROM (clr). She delivered by spontaneous vaginal a Live Born infant on 3/4/22. Information for the patient's :  Germaine Hart [[id number]]   male   Birth Weight: 7 lb 3.7 oz (3.28 kg)       Apgars: 8 at 1 minute and 9 at 5 minutes.      Postpartum course: normal.      Course of patient: complicated by newly diagnosed PreEclampsia without severe features     Discharge to: Home    Readmission planned: no     Recommendations on Discharge:     Medications:      Medication List      START taking these medications    acetaminophen 500 MG tablet  Commonly known as: TYLENOL  Take 2 tablets by mouth every 6 hours as needed for Pain     ferrous sulfate 325 (65 Fe) MG tablet  Commonly known as: IRON 325  Take 1 tablet by mouth daily (with breakfast)     ibuprofen 600 MG tablet  Commonly known as: ADVIL;MOTRIN  Take 1 tablet by mouth every 6 hours as needed for Pain     sennosides-docusate sodium 8.6-50 MG tablet  Commonly known as: SENOKOT-S  Take 1 tablet by mouth daily        CONTINUE taking these medications    PRENATAL PO     TUMS PO           Where to Get Your Medications      These medications were sent to [facility] [address] [place] [address], [address] [place] [address] Rd - F [phone] [id number] [facility] [address]    Phone: [phone]   · acetaminophen 500 MG tablet  · ferrous sulfate 325 (65 Fe) MG tablet  · ibuprofen 600 MG tablet  · sennosides-docusate sodium 8.6-50 MG tablet           Activity: pelvic rest x 6 weeks, no lifting greater than 15 lbs  Diet: regular diet  Follow up: 1 week for blood pressure check     Condition on discharge: stable    Discharge date: 3/6/22    Stacy Mcclure DO  Ob/Gyn Resident    Comments:  Home care and follow-up care were reviewed. Pelvic rest, and birth control were reviewed. Signs and symptoms of mastitis and post partum depression were reviewed. The patient is to notify her physician if any of these occur. The patient was counseled on secondary smoke risks and the increased risk of sudden infant death syndrome and respiratory problems to her baby with exposure. She was counseled on various alternate recommendations to decrease the exposure to secondary smoke to her children.

## 2022-03-04 NOTE — ANESTHESIA POSTPROCEDURE EVALUATION
Department of Anesthesiology  Postprocedure Note    Patient: Maya Hanks  MRN: [id number]  YOB: 2000  Date of evaluation: 3/4/2022  Time:  6:26 PM     Procedure Summary     Date: 03/04/22 Room / Location:     Anesthesia Start: 0821 Anesthesia Stop: 2296    Procedure: Labor Analgesia Diagnosis:     Scheduled Providers:  Responsible Provider: Kristin Pruett MD    Anesthesia Type: epidural ASA Status: 2          Anesthesia Type: epidural    Slade Phase I:      Slade Phase II:      Last vitals: Reviewed and per EMR flowsheets.        Anesthesia Post Evaluation    Patient location during evaluation: bedside  Patient participation: complete - patient participated  Level of consciousness: awake and alert  Pain score: 0  Airway patency: patent  Nausea & Vomiting: no nausea and no vomiting  Complications: no  Cardiovascular status: hemodynamically stable  Respiratory status: acceptable and room air  Hydration status: euvolemic

## 2022-03-04 NOTE — PROGRESS NOTES
Labor Progress Note    Martinez Love is a 24 y.o. female  at 37w1d  The patient was seen and examined. Her pain is well controlled. She reports fetal movement is present, complains of contractions, denies loss of fluid, denies vaginal bleeding. Vital Signs:  Vitals:    22 0200 22 0230 22 0300 22 0330   BP: 126/71 116/67 124/62 127/80   Pulse: 64 76 64 81   Resp:   18    Temp:   97.8 °F (36.6 °C)    TempSrc:   Oral    SpO2:       Weight:       Height:             FHT: 115, moderate variability, accelerations present, decelerations absent  Contractions: irregular contractions    Chaperone for Intimate Exam: Chaperone was present for entire exam, Chaperone Name: Stefani Holt RN  Cervical Exam: 2 cm dilated, 50 effaced, -2 station  Pitocin: @ 0 mu/min    Membranes: Intact  Scalp Electrode in place: absent  Intrauterine Pressure Catheter in Place: absent    Interventions: IVF and position changes, BSUS    Assessment/Plan:  Martinez Love is a 24 y.o. female  at 37w4d admitted for spontaneous labor   - GBS negative, No indication for GBS prophylaxis   - VSS, afebrile   - cEFM/TOCO - Periods of fetal bradycardia, baseline 105-110. BPP 8/10, off for breathing. Overall reassuring fetal status. Good fetal movement, JOSÉ 10.39cm. - Will augment spontaneous labor with Pitocin in the setting of newly diagnosed preeclampsia without severe features.    - Pitocin ordered   - Continue to monitor closely    Preeclampsia w/o SF's   - Blood pressures elevated, last severe range @ 2207 3/3/22   - PreE labs wnl, P/C 0.73   - Denies s/s preeclampsia      Attending updated and in agreement with plan    Tiburcio Crum DO  Ob/Gyn Resident  3/4/2022, 3:42 AM

## 2022-03-05 PROBLEM — O09.90 HIGH RISK PREGNANCY, ANTEPARTUM: Status: RESOLVED | Noted: 2022-02-24 | Resolved: 2022-03-05

## 2022-03-05 PROBLEM — R80.0 ISOLATED PROTEINURIA: Status: RESOLVED | Noted: 2022-01-10 | Resolved: 2022-03-05

## 2022-03-05 PROBLEM — Z3A.37 37 WEEKS GESTATION OF PREGNANCY: Status: RESOLVED | Noted: 2022-03-03 | Resolved: 2022-03-05

## 2022-03-05 LAB — FETAL SCREEN: NORMAL

## 2022-03-05 PROCEDURE — 6370000000 HC RX 637 (ALT 250 FOR IP): Performed by: STUDENT IN AN ORGANIZED HEALTH CARE EDUCATION/TRAINING PROGRAM

## 2022-03-05 PROCEDURE — 96372 THER/PROPH/DIAG INJ SC/IM: CPT

## 2022-03-05 PROCEDURE — 6360000002 HC RX W HCPCS: Performed by: STUDENT IN AN ORGANIZED HEALTH CARE EDUCATION/TRAINING PROGRAM

## 2022-03-05 PROCEDURE — 1220000000 HC SEMI PRIVATE OB R&B

## 2022-03-05 PROCEDURE — 36415 COLL VENOUS BLD VENIPUNCTURE: CPT

## 2022-03-05 PROCEDURE — 85461 HEMOGLOBIN FETAL: CPT

## 2022-03-05 RX ORDER — FERROUS SULFATE 325(65) MG
325 TABLET ORAL
Qty: 90 TABLET | Refills: 1 | Status: SHIPPED | OUTPATIENT
Start: 2022-03-05

## 2022-03-05 RX ORDER — IBUPROFEN 600 MG/1
600 TABLET ORAL EVERY 6 HOURS PRN
Qty: 60 TABLET | Refills: 1 | Status: SHIPPED | OUTPATIENT
Start: 2022-03-05 | End: 2022-08-16

## 2022-03-05 RX ORDER — SENNA AND DOCUSATE SODIUM 50; 8.6 MG/1; MG/1
1 TABLET, FILM COATED ORAL DAILY
Qty: 30 TABLET | Refills: 1 | Status: SHIPPED | OUTPATIENT
Start: 2022-03-05 | End: 2022-04-04

## 2022-03-05 RX ORDER — ACETAMINOPHEN 500 MG
1000 TABLET ORAL EVERY 6 HOURS PRN
Qty: 80 TABLET | Refills: 1 | Status: SHIPPED | OUTPATIENT
Start: 2022-03-05 | End: 2022-08-16

## 2022-03-05 RX ADMIN — IBUPROFEN 600 MG: 600 TABLET, FILM COATED ORAL at 02:38

## 2022-03-05 RX ADMIN — HUMAN RHO(D) IMMUNE GLOBULIN 300 MCG: 300 INJECTION, SOLUTION INTRAMUSCULAR at 09:39

## 2022-03-05 ASSESSMENT — PAIN SCALES - GENERAL
PAINLEVEL_OUTOF10: 0
PAINLEVEL_OUTOF10: 1

## 2022-03-05 NOTE — ADDENDUM NOTE
Addendum  created 03/05/22 7221 by FIDEL Steve - NITO    [facility] properties accepted

## 2022-03-05 NOTE — PROGRESS NOTES
POST PARTUM DAY # 2    Martinez Love is a 24 y.o. female  This patient was seen & examined today. Her pregnancy was complicated by:   Patient Active Problem List   Diagnosis    FHx Cleft Palate    Elv 1hr GTT, no 3hr GTT    Hx GDMA1 (G1)    Tobacco use    Late transfer of care    Rh Negative     Late prenatal care     3/4/22 M Apg 8/9 Wt 7#3    Preeclampsia w/o SF (G2)    Encounter for care and examination of mother immediately after delivery       Today she is doing well without any chief complaint. Her lochia is light. She denies chest pain, shortness of breath, headache, lightheadedness, and blurred vision. She is  breast feeding and she denies any breast tenderness. She is ambulating well. Her voiding pattern is normal. I reviewed signs and symptoms of post partum depression with the patient, she currently denies any of these symptoms. She is tolerating solids. Vital Signs:  Vitals:    22 0015 22 0434 22 0800 22 1600   BP: 119/78 119/68 121/70 120/70   Pulse: 68 65 76 66   Resp: 17 17 18 18   Temp: 98.3 °F (36.8 °C)  98.5 °F (36.9 °C) 97.7 °F (36.5 °C)   TempSrc: Oral  Oral Oral   SpO2:   98%    Weight:       Height:             Physical Exam:  General:  no apparent distress, alert, and cooperative  Neurologic:  alert, oriented, normal speech, no focal findings or movement disorder noted  Lungs:  No increased work of breathing, good air exchange, clear to auscultation bilaterally, no crackles or wheezing  Heart:  regular rate and rhythm    Abdomen: abdomen soft, non-distended, non-tender  Fundus: non-tender, normal size, firm, below umbilicus  Extremities:  no calf tenderness, non edematous    Lab:  Lab Results   Component Value Date    HGB 10.0 (L) 2022     Lab Results   Component Value Date    HCT 31.8 (L) 2022       Assessment/Plan:  1.  Martinez Love is a [id number] PPD # 2 s/p    - Doing well, VSS   - male infant in [address], circumcision done   - Encourage ambulation   - Postpartum Hgb/Hct if symptomatic    - Continue post partum care  2. Rh positive/Rubella immune   - MMR and rhogam not indicated   3. Breast feeding    - Denies s/s of mastits  4. Pre E w/o SF   - Newly diagnosed   - Denies s/s of pre E   - Met criteria based off newly elevated BP   - Pre E labs wnl, P/C 0.73   - BP normotensive overnight   5. Anemia   - Hg 10 on admission   - VSS, asymptomatic   - Iron given on discharge  6. Anxiety/ADHD   - Controlled on no meds   - Denies SI/HI  7. Tobacco Use   - Cessation encouraged  8. BMI 30    Counseling Completed:  Secondary Smoke risks and Sudden Infant Death Syndrome were reviewed with recommendations. Infant sleeping, \"back to sleep\" and avoidance of co-sleeping recommendations were reviewed. Signs and Symptoms of Post Partum Depression were reviewed. The patient is to call if any occur. Signs and symptoms of Mastitis were reviewed. The patient is to call if any occur for follow up.   Discharge instructions including pelvic rest, no driving with pain medicine and office follow-up were reviewed with patient     Attending Physician: Dr. Everett Hanson DO  Ob/Gyn Resident   3/6/2022, 12:22 AM

## 2022-03-05 NOTE — PROGRESS NOTES
2022       Assessment/Plan:  1. Vivian Pan is a  PPD # 1 s/p    - Doing well, VSS   - male infant in [address], circumcision desired   - Encourage ambulation   - Postpartum Hgb/Hct if symptomatic   - Motrin/Tylenol PRN pain control   - Continue postpartum care   2. Rh negative/Rubella immune  - Rhogam indicated and ordered  - MMR not indicated  3. Breast feeding   - Denies s/sx mastitis  4. PreEclampsia without severe features (newly diagnosed)  - Patient met criteria with newly elevated BP, PreE labs were wnl, P/C increased to 0.73 on admission  - BP now normotensive since delivery  - Denies s/sx PreE   - Continue to monitor closely    5. Anemia   - Hgb 10.0 on admission   - VSS, clinically asymptomatic   - Rx iron printed on discharge   6. Anxiety/ADHD   - Controlled on no medications  - Denies exacerbation of symptoms   7. Tobacco Use   - Encouraged cessation   8. BMI 30      Counseling Completed:  Secondary Smoke risks and Sudden Infant Death Syndrome were reviewed with recommendations. Infant sleeping, \"back to sleep\" and avoidance of co-sleeping recommendations were reviewed. Signs and Symptoms of Post Partum Depression were reviewed. The patient is to call if any occur. Signs and symptoms of Mastitis were reviewed. The patient is to call if any occur for follow up. Discharge instructions including pelvic rest, no driving with pain medicine and office follow-up were reviewed with patient     Attending Physician: Dr. Thanh Hung DO  Ob/Gyn Resident   3/5/2022, 1:14 AM          Attending Physician Statement  I have discussed the care of Vivian Pan, including pertinent history and exam findings,  with the resident. I have seen and examined the patient and the key elements of all parts of the encounter have been performed by me. I agree with the assessment, plan and orders as documented by the resident. (GC Modifier)    Pt seen and examined.   She is feeling great without complaint. She is ambulating, tolerating PO, and voiding. Vitals:    22 1901 22 2100 22 0015 22 0434   BP: 122/68 119/65 119/78 119/68   Pulse: 77 66 68 65   Resp: 18 17 17 17   Temp:  98.2 °F (36.8 °C) 98.3 °F (36.8 °C)    TempSrc:  Oral Oral    SpO2:  96%     Weight:       Height:         Recent Results (from the past 24 hour(s))   RHOGAM INJECTION ONLY    Collection Time: 22  8:15 PM   Result Value Ref Range    Unit Number [id number]     Product Code RHIG     Unit Divison 00     Dispense Status ISSUED     Transfusion Status OK TO TRANSFUSE    FETAL SCREEN    Collection Time: 22  8:23 AM   Result Value Ref Range    Fetal Screen MASSIVE FETAL HEMORRHAGE RULED OUT       PPD #1, male circ done  Rh neg - Rhogam given 3/5  PreE without severe features   - VSS, asymptomatic  Anemia - outpatient iron  Counseled in depth on PP depression, she voices understanding. Discharge Instructions for Labor and Delivery, Vaginal Birth     A vaginal birth refers to the baby being delivered through the vagina. The amount of time that labor can take varies greatly. Labor for the average first-born baby is about 12 hours. Usually your hospital stay after a routine delivery is no more than two nights. Some new mothers go home the same day. Recovery from childbirth varies depending upon whether you had an episiotomy (an incision in the perineum, the area between your vaginal opening and your anus), the duration of labor and delivery, and the amount of rest you get. In general, it takes about 6-8 weeks for a woman's body to recover from childbirth. What You Will Need   Along with your medications, you will need the following:   · Sanitary pads    · Nursing pads    · Witch hazel pads    · Sitz bath    Steps to [address] will want to arrange for transportation home for you and your baby. The baby will need a car seat.  You will receive instructions in the hospital for breastfeeding and taking care of the perineum area. You may use ointment for cracked nipples or warm water rinses to your perineum. · You will need to wear sanitary pads for about six weeks after delivery. · If you had an episiotomy or vaginal tear, you will be sent home with a plastic squirt bottle. Fill it with warm water and squirt over the vaginal and anal area every time you urinate and defecate. · Warm baths can be soothing to healing tissues. · Apply warm or cold cloths to sore breasts. · Apply ointment to cracked nipples. · Use nursing pads for leaky breast.    · Apply witch hazel pads to sore perineum (area between vagina and anus). · Ask your doctor about when it is safe for you to shower or bathe. · Sit in a sitz bath to soothe sore perineum and/or hemorrhoids. A sitz bath is soaking the hip and buttocks area in warm water. You can buy a plastic sitz bath at most [employer]. It will fit on your toilet. You can also use your bathtub. Diet    Eat a well balanced, healthy diet to help your recover from childbirth. If you are breastfeeding, you will need additional calories each day. You may also be advised to avoid certain foods. Some women experience constipation after childbirth. To avoid this problem:   · Drink plenty of fluids. · Eat foods high in fiber, such as:   ¨ Whole grain cereals and breads   ¨ Fruits and vegetables   ¨ Legumes (eg, beans, lentils)   Physical Activity    Labor and delivery is tiring. Rest when you can to regain your energy. Your doctor will encourage you to exercise by walking. Ask your doctor when you will be able to return to more strenuous exercise. Your doctor may suggest you do Kegel exercises to strengthen your pelvic muscles. To do these tighten your muscles as if you were stopping your urine flow. Hold for a few seconds and then relax. Do these throughout the day. Medications    Breastfeeding can cause your uterus to contract. If painful, your doctor may recommend a pain reliever. If you are breastfeeding, it's important to ask your doctor about taking medications. You may receive from the hospital a list of medications to avoid. Once your doctor has approved your medications, it's important to:   · Take your medication as directed. Do not change the amount or the schedule. · Do not stop taking them without talking to your doctor. · Do not share them. · Know what the results and side effects may be. Report bothersome side effects to your doctor. · Some drugs can be dangerous when mixed. Talk to a doctor or pharmacist if you are taking more than one drug. This includes over-the-counter medication and herb or dietary supplements. · Plan ahead for refills so you don't run out. Lifestyle Changes    Having a baby requires significant lifestyle changes. You and your doctor will plan lifestyle changes that will help you recover. Some things to keep in mind include:   · It is important to get enough rest so you can recover. Try sleeping when the baby sleeps. · Ask your doctor when you can resume sexual relations. If you have not done so already, talk to your doctor about birth control options. · If you are breastfeeding, consider a breast pump. · Call your obstetrician and/or pediatrician for any questions that arise. · Understand the changes your body is going through as it recovers from childbirth:   ¨ Hot and cold flashes as your body adjusts to new hormone and blood flow levels   ¨ Urinary or fecal incontinence due to stretched muscles   ¨ After pains from uterine contractions as the uterus shrinks   ¨ Vaginal bleeding (called lochia), which is heavier than your period (generally stops within two months)   ¨ Baby blues, feelings of irritability, sadness, crying, or anxiety. Postpartum depression is more severe, occurring in 10%-20% of new moms. If you experience such symptoms, contact your doctor.    · Consider joining a support group for new mothers. You can get encouragement and learn parenting strategies. Follow-up   Schedule a follow-up appointment as directed by your doctor. Call Your Doctor If Any of the Following Occurs   It is important for you to monitor your recovery once you leave the hospital. That way, you can alert your doctor to any problems immediately. If any of the following occurs, call your doctor:   · Signs of infection, including fever and chills    · Increased bleeding: soaking more than one sanitary pad an hour    · Wounds that become red, swollen or drain pus    · Vaginal discharge that smells foul    · New pain, swelling, or tenderness in your legs    · Pain that you can't control with the medications you've been given    · Pain, burning, urgency or frequency of urination, or persistent bleeding in the urine    · Cough, shortness of breath, or chest pain    · Depression, suicidal thoughts, or feelings of harming your baby    · Breasts that are hot, red and accompanied by fever    · Any cracking or bleeding from the nipple or areola (the dark-colored area of the breast)      In case of an emergency, call 911 immediately.        Loreta Reveles, DO

## 2022-03-05 NOTE — CARE COORDINATION
Discharge Planning      Face reflects insurance as follows:    [facility] ( [facility]) [address]    Per Lex Barakat, insurance is [address]    Correction faxed to HELP program [phone]

## 2022-03-05 NOTE — CARE COORDINATION
CASE MANAGEMENT POST-PARTUM TRANSITIONAL CARE PLAN    37 weeks gestation of pregnancy [Z3A.37]    OB Provider: Dr. Rosa Maria Mondragon met w/ Stefanie Bob  at bedside to discuss DCP. She is S/P   on 3/4/22    Writer verified name/address/phone number correct on facesheet. [facility]  insurance correct. Writer notified Stefanie Bob she has  30 days from date of birth to add  to insurance policy. She verbalized understanding.     Maternal Jass Mandel going to purchase bassinet today before discharge    Infant name on BC: Fariba So PCP Dr. Joy Martinez     DME: Glucometer      HOME CARE: none    Barriers to DC: none     Anticipate DC of couplet 3/6/22    Readmission Risk              Risk of Unplanned Readmission:  5          Case management will continue to follow for discharge needs     Signature: Electronically signed by Sheng Gomez RN on 3/5/22 at 10:45 AM EST

## 2022-03-05 NOTE — LACTATION NOTE
Mom reports baby feeding okay at breast, having some difficulty at times with latch. First son was tongue tied and had to be \"snipped\", after which he fed fine at breast for ~3 months. Oral exam on baby remarkable for labial frenulum attached to the edge of the gum, but very elastic when lip flanged. Lingual frenulum attached to the tip of the tongue, blanches when elevated. Strong suck on gloved finger and able to maintain suction when traction applied to chin. Follow up information on specialist for oral evaluation offered, mom wishes to follow up with baby's pediatrician first. Reviewed  feeding expectations, benefits of STS, and encouraged to call for assist with next feed. Education booklet given.

## 2022-03-05 NOTE — L&D DELIVERY NOTE
Mother's Information    Labor Events     labor?: No  Rupture type: AROM  Fluid color: Clear  Fluid odor: None     Mother Delivery Information    Episiotomy: None  Lacerations: 1st  Repair Suture: None  Surgical or Additional Est. Blood Loss (mL): 0 (View Only): Edit in Flowsheets   Combined Est. Blood Loss (mL): 0        Long, Baby Boy [facility] [[id number]]    Labor Events     labor?: No   steroids?: None  Cervical ripening date/time:     Antibiotics received during labor?: No  Rupture Identifier: Sac 1   Rupture date/time: 3/4/22 09:27:00   Rupture type: AROM  Fluid color: Clear  Fluid odor: None  Induction: Oxytocin  Indications for induction: Hypertension  Augmentation: None  Labor complications: None          Labor Event Times    Labor onset date/time: 3/4/22 0927   Dilation complete date/time:  3/4/22 1800   Start pushing: 3/4/2022 1800   Decision time (emergent ):        Anesthesia    Method: Epidural     Assisted Delivery Details    Forceps attempted?: No  Vacuum extractor attempted?: No     Document Additional Attempt       Document Additional Attempt             Shoulder Dystocia    Shoulder dystocia present?: No  Add Second Maneuver  Add Third Maneuver  Add Fourth Maneuver  Add Fifth Maneuver  Add Sixth Maneuver  Add Seventh Maneuver  Add Eighth Maneuver  Add Ninth Maneuver     Grover Presentation    Presentation: Compound  Position: Right  _: Occiput  _: Posterior      Information    Head delivery date/time: 3/4/2022 18:08:00   Changing the 's delivery date/time could affect patient care.:    Delivery date/time:  3/4/22 1808   Delivery type: Vaginal, Spontaneous    Details:        Delivery Providers    Delivering clinician: Tamy Romero DO   Provider Role    Kimberley Avitia DO Resident    Nancylee Fothergill, RN Delivery Nurse    Tha Salmon RN Charge Nurse      Cord    Vessels: 3 Vessels  Complications: None  Delayed cord clamping?: Yes  Cord clamped date/time: 3/4/2022 1809  Cord blood disposition: Lab  Gases sent?: Yes  Stem cell collection (by provider): No     Placenta    Date/time: 3/4/2022 18:12:00  Removal: Spontaneous  Appearance: Intact  Disposition: Pathology     Delivery Resuscitation    Method: Bulb Suction, Stimulation     Apgars    Living status: Living  Apgars   1 Minute:  5 Minute:  10 Minute 15 Minute 20 Minute   Skin Color: 0  1       Heart Rate: 2  2       Reflex Irritability: 2  2       Muscle Tone: 2  2       Respiratory Effort: 2  2       Total: 8  9               Apgars Assigned ByElisa Bertrand RN     Skin to Skin    Skin to skin initiation date/time: 3/4/22 18:08:00 EST   Skin to skin with:  Mother  Skin to skin end date/time:         Measurements    Weight: 3280 g Length: 50.8 cm      Delivery Information    Episiotomy: None  Perineal lacerations: 1st Repaired: Yes   Vaginal laceration: No    Cervical laceration: No    Surgical or additional est. blood loss (mL): 0 (View Only): Edit in Flowsheets   Combined est. blood loss (mL): 0  Repair suture: None     Vaginal Delivery Counts    Initial count personnel: José Miguel Galvin  Initial count verified by: CHEYENNE   4x4:  Needles:  Instruments:  Lap Pads:  Sponges:    Initial counts:         Final counts:         Final count personnel: Felicia Polanco  Final count verified by: CHEYENNE  Accurate final count?: Yes  Final vaginal sweep completed: vaginal sweep not performed     Other Procedures    Procedures: None            Vaginal Delivery Note  Department of Obstetrics and Gynecology  [facility]       Patient: Bria Garnica   : 2000  MRN: [id number]   Date of delivery: 3/4/22     Pre-operative Diagnosis: Lamar Stephenson  at [address] 2/2 PreE w/o SF's   Anemia  Elevated 1-hr GTT   Hx GDMA1  Late Prenatal care   BMI 30    Post-operative Diagnosis:  Same as above, living  male infant     Delivering Obstetrician & Assistant(s): Dr. Lola Morrissey, DO; Ananth Lino Tien Babcock DO, PGY2    Infant Information:   Information for the patient's :  Sayda Duffy [[id number]]        Information for the patient's :  Sayda Duffy [[id number]]          Apgar scores: 8 at 1 minute and 9 at 5 minutes. Anesthesia:  epidural anesthesia    Application and Delivery:    She was known to be GBS negative. The patient progressed well, received an epidural, became complete and felt the urge to push. After pushing with contractions the fetal head delivered Cephalic, right occiput posterior over an intact perineum, nuchal cord was not present. The anterior, then posterior shoulder delivered easily and atraumatically followed by the rest of the infant. Nose and mouth suctioned with bulb suction, infant was stimulated and dried. Cord was clamped and cut after one minute delayed cord clamping and infant was placed on the maternal abdomen, and attended by RN for evaluation. The delivery of the placenta was spontaneous and appeared intact, whole and that the umbilical cord had three vessels noted. Pitocin was started. The vagina was swept of all clots and debris. The perineum and vagina were evaluated and a midline 1st degree perineal laceration was found and repaired in standard fashion with 3-0 vicryl. Mother and baby tolerated procedure well. Dr. Rush Ruano was present for entire delivery.     Delivery Summary:       Specimen: placenta sent to pathology, cord blood and cord gases  Quantitative blood loss:  150ml immediately following delivery  Condition:  Mother and infant stable  Counts: instrument and sponge counts correct  Blood Type and Rh: A NEGATIVE    Rubella Immunity Status: immune      Trev Murcia DO  Ob/Gyn Resident  3/4/2022, 7:40 PM

## 2022-03-06 VITALS
OXYGEN SATURATION: 99 % | WEIGHT: 197 LBS | TEMPERATURE: 98.3 F | HEIGHT: 67 IN | RESPIRATION RATE: 18 BRPM | DIASTOLIC BLOOD PRESSURE: 75 MMHG | SYSTOLIC BLOOD PRESSURE: 131 MMHG | BODY MASS INDEX: 30.92 KG/M2 | HEART RATE: 81 BPM

## 2022-03-06 LAB
BLD PROD TYP BPU: NORMAL
DISPENSE STATUS BLOOD BANK: NORMAL
TRANSFUSION STATUS: NORMAL
UNIT DIVISION: 0
UNIT NUMBER: NORMAL

## 2022-03-06 PROCEDURE — 59409 OBSTETRICAL CARE: CPT | Performed by: OBSTETRICS & GYNECOLOGY

## 2022-03-06 NOTE — FLOWSHEET NOTE
Discharge instructions given, all questions answered. I have reviewed all [facility] Post-Birth Warning Signs and essential teaching points for pulmonary embolism, cardiac disease, hypertensive disorders of pregnancy, obstetric hemorrhage, venous thromboembolism, infection, and postpartum depression with the patient and mom . I have informed the patient on when to call their healthcare provider and when to call 911. I have discussed with the patient  the importance of scheduling a follow-up visit with their physician, nurse practitioner or midwife and provided them with correct contact information for appointment. I have provided the patient with a copy of the \"Save Your Life\" handout. The patient has acknowledged receiving and understanding this education with her signature.

## 2022-03-08 LAB — SURGICAL PATHOLOGY REPORT: NORMAL

## 2022-03-18 ENCOUNTER — POSTPARTUM VISIT (OUTPATIENT)
Dept: OBGYN CLINIC | Age: 22
End: 2022-03-18

## 2022-03-18 VITALS — WEIGHT: 180 LBS | BODY MASS INDEX: 28.19 KG/M2 | SYSTOLIC BLOOD PRESSURE: 120 MMHG | DIASTOLIC BLOOD PRESSURE: 64 MMHG

## 2022-03-18 DIAGNOSIS — O14.90 PRE-ECLAMPSIA, ANTEPARTUM: ICD-10-CM

## 2022-03-18 DIAGNOSIS — F39 MOOD DISORDER (HCC): ICD-10-CM

## 2022-03-18 PROCEDURE — 99024 POSTOP FOLLOW-UP VISIT: CPT | Performed by: ADVANCED PRACTICE MIDWIFE

## 2022-03-18 NOTE — PROGRESS NOTES
[address] OB/GYN [facility]  [place]  [address] [address]  Dept: [phone]  Patient Name: Christy Mondragon  Patient Age: 24 y.o. Date of Visit: 3/18/2022    SUBJECTIVE:    Chief Complaint:  Chief Complaint   Patient presents with    Postpartum Care       HPI:  DELIVERY DATE: 3/4/22  TYPE OF DELIVERY: normal spontaneous vaginal delivery  PROVIDER:   PERINEUM: intact    Chris Burnett was seen for her 2 week visit. Her Male infant is healthy. She is breast feeding. She is breastfeeding without difficulty. She is not experiencing pain. She is rating her pain 0  She reports her lochia is thin lochia  She reports no perineal discomfort. She denies urinary incontinence. Her bowels have returned to her normal pattern. She is back to her normal activity pattern. Chris Burnett has not engaged in intercourse. She does report a mood disorder. Reports feeling overwhelmed at times. Reports has ampule help at home, would be open to counseling for help with coping mechanisms. Reports no self harm ideation or harm of anyone else. Reports is bonding very well with baby. She feels she is not having difficulty coping. Chris Burnett feels her family adjustment is effective. OBJECTIVE:   Wt Readings from Last 3 Encounters:   22 180 lb (81.6 kg)   22 197 lb (89.4 kg)   22 197 lb (89.4 kg)       Blood pressure 120/64, weight 180 lb (81.6 kg), last menstrual period 2021, currently breastfeeding. ASSESSMENT/PLAN:  1.  3/4/22 M Apg 8/9 Wt 7#3    2. 2 weeks postpartum follow-up  · Labs were not ordered. · Return to the office in 4 weeks. · She will return for 6 week PP visit  breast feeding  · Signs & Symptoms of mastitis reviewed; notify if occurs  Family planning/birth spacing needs  Family planning counseling was initiated/completed.    Smoker/non-smoker   Secondary smoke risks were reviewed, including increased risks of respiratory     problems, Sudden Infant Death Syndrome, and potential malignancies. 3. Pre-eclampsia, antepartum  - Reviewed signs and symptoms of preeclampsia, patient denies any signs or symptoms today. Blood pressure normotensive at visit. 4. Mood Disorder  - Referral sent    Patient was seen with total face to face time 15 minutes.   More than 50% of this visit was counseling and education regarding Post Partum visit

## 2022-04-11 ENCOUNTER — POSTPARTUM VISIT (OUTPATIENT)
Dept: OBGYN CLINIC | Age: 22
End: 2022-04-11

## 2022-04-11 VITALS — SYSTOLIC BLOOD PRESSURE: 112 MMHG | DIASTOLIC BLOOD PRESSURE: 64 MMHG | WEIGHT: 187 LBS | BODY MASS INDEX: 29.29 KG/M2

## 2022-04-11 DIAGNOSIS — Z13.1 ENCOUNTER FOR SCREENING FOR DIABETES MELLITUS: ICD-10-CM

## 2022-04-11 DIAGNOSIS — R73.09 ELEVATED GLUCOSE TOLERANCE TEST: ICD-10-CM

## 2022-04-11 PROCEDURE — 0503F POSTPARTUM CARE VISIT: CPT | Performed by: NURSE PRACTITIONER

## 2022-04-11 RX ORDER — MEDROXYPROGESTERONE ACETATE 150 MG/ML
150 INJECTION, SUSPENSION INTRAMUSCULAR ONCE
Qty: 1 ML | Refills: 1 | Status: SHIPPED | OUTPATIENT
Start: 2022-04-11 | End: 2022-08-16

## 2022-04-11 ASSESSMENT — ENCOUNTER SYMPTOMS
CONSTIPATION: 0
DIARRHEA: 0
COLOR CHANGE: 0
SHORTNESS OF BREATH: 0
VOMITING: 0
WHEEZING: 0
NAUSEA: 0

## 2022-04-11 NOTE — PROGRESS NOTES
[address] OB/GYN [facility]  [address] [place]  [address] [address]  Dept: [phone]  Dept Fax: [phone]    Jaylin Harvey is a 24 y.o. female who presents today for her medical conditions/complaintsas noted below. Jaylin Harvey is c/o of Postpartum Care        HPI:     Althea Zayas presents for 6 week postpartum check up. Delivered Vaginally  on 3/4/22 No complaints, of chest pain, S.O.B. Headaches, no abdominal pain, GI or  issues. Breastfeeding Yes Signs mastitis No  Bleeding light spotting sometimes  Birth control options abstinence but would like to start depo provera has been on in past.   The patient completed the E.P.D.S. Evaluation form and scored 12 asst her 2 week PP follow up, today she scored 11. She was given referral for therapist but has not followed up. States she feels anxious at times but overall like coping well mother is here with her and feels has good support from mom whom she lives with. Declined medication therapy. Denies suicidal/homicidal ideations or plans. Last PAP: n/a    Failed 1 hr GTT never competed 3 hr GTT      OB History    Para Term  AB Living   2 2 2     2   SAB IAB Ectopic Molar Multiple Live Births           0 2      # Outcome Date GA Lbr Saul/2nd Weight Sex Delivery Anes PTL Lv   2 Term 22 37w4d 08:33 / 00:08 7 lb 3.7 oz (3.28 kg) M Vag-Spont EPI N JOSUÉ   1 Term 19 38w5d  8 lb 4 oz (3.742 kg) M Vag-Spont   JOSUÉ       Past Medical History:   Diagnosis Date    ADHD (attention deficit hyperactivity disorder)     Anxiety     Preeclampsia w/o SF (G2) 3/4/2022    Met criteria with elevated blood pressures and proteinuria      M Apg 8/9 Wt 7#3 3/4/2022    Urinary reflux       Past Surgical History:   Procedure Laterality Date    TONSILLECTOMY      TYMPANOSTOMY TUBE PLACEMENT         History reviewed. No pertinent family history.     Social History     Tobacco Use    Smoking status: Never Smoker    Smokeless tobacco: Never Used   Substance Use Topics    Alcohol use: No      Current Outpatient Medications   Medication Sig Dispense Refill    medroxyPROGESTERone (DEPO-PROVERA) 150 MG/ML injection Inject 1 mL into the muscle once for 1 dose Schedule injections every 12 weeks for birth control. 1 mL 1    ibuprofen (ADVIL;MOTRIN) 600 MG tablet Take 1 tablet by mouth every 6 hours as needed for Pain 60 tablet 1    acetaminophen (TYLENOL) 500 MG tablet Take 2 tablets by mouth every 6 hours as needed for Pain 80 tablet 1    ferrous sulfate (IRON 325) 325 (65 Fe) MG tablet Take 1 tablet by mouth daily (with breakfast) 90 tablet 1    Calcium Carbonate Antacid (TUMS PO) Take by mouth      Prenatal Vit-Fe Fumarate-FA (PRENATAL PO) Take by mouth       No current facility-administered medications for this visit. No Known Allergies    Health Maintenance   Topic Date Due    Hepatitis C screen  Never done    Varicella vaccine (1 of 2 - 2-dose childhood series) Never done    COVID-19 Vaccine (1) Never done    Depression Screen  Never done    HIV screen  Never done    Pap smear  Never done    Chlamydia screen  01/10/2023    Potassium monitoring  03/03/2023    Creatinine monitoring  03/03/2023    DTaP/Tdap/Td vaccine (8 - Td or Tdap) 01/20/2032    Hepatitis A vaccine  Completed    Hepatitis B vaccine  Completed    Hib vaccine  Completed    HPV vaccine  Completed    Meningococcal (ACWY) vaccine  Completed    Flu vaccine  Completed    Pneumococcal 0-64 years Vaccine  Aged Out       Subjective:     Review of Systems   Constitutional: Negative for chills and fever. Respiratory: Negative for shortness of breath and wheezing. Cardiovascular: Negative for chest pain, palpitations and leg swelling. Gastrointestinal: Negative for constipation, diarrhea, nausea and vomiting.    Genitourinary: Negative for dysuria, flank pain, pelvic pain, vaginal bleeding, vaginal discharge and vaginal pain.   Skin: Negative for color change and pallor. Neurological: Negative for dizziness, light-headedness and headaches. Psychiatric/Behavioral: Negative for self-injury and suicidal ideas. The patient is nervous/anxious. Objective:     Physical Exam  Vitals and nursing note reviewed. Constitutional:       General: She is not in acute distress. Appearance: She is well-developed. She is not diaphoretic. HENT:      Head: Normocephalic and atraumatic. Right Ear: External ear normal.      Left Ear: External ear normal.      Nose: Nose normal.   Eyes:      Pupils: Pupils are equal, round, and reactive to light. Neck:      Thyroid: No thyromegaly. Cardiovascular:      Rate and Rhythm: Normal rate and regular rhythm. Heart sounds: Normal heart sounds. No murmur heard. No friction rub. No gallop. Comments: No calf tenderness or swelling  Pulmonary:      Effort: Pulmonary effort is normal.      Breath sounds: Normal breath sounds. No wheezing. Abdominal:      General: Bowel sounds are normal.      Palpations: Abdomen is soft. Tenderness: There is no abdominal tenderness. There is no guarding. Genitourinary:     Comments: Pt declined  Musculoskeletal:         General: Normal range of motion. Cervical back: Normal range of motion and neck supple. Lymphadenopathy:      Cervical: No cervical adenopathy. Skin:     General: Skin is warm and dry. Findings: No rash. Neurological:      Mental Status: She is alert and oriented to person, place, and time. Cranial Nerves: No cranial nerve deficit. Psychiatric:         Behavior: Behavior normal.         Thought Content: Thought content normal.         Judgment: Judgment normal.       /64 (Site: Left Upper Arm, Position: Sitting, Cuff Size: Medium Adult)   Wt 187 lb (84.8 kg)   LMP 2021   Breastfeeding Yes   BMI 29.29 kg/m²     Assessment:     Normal Postpartum        Diagnosis Orders   1.   3/4/22 M Apg 8/9 Wt 7#3  Glucose Tolerance, 2 Hours   2. Elevated glucose tolerance test  Glucose Tolerance, 2 Hours   3. Encounter for screening for diabetes mellitus   Glucose Tolerance, 2 Hours       Plan:   Cont restrictions through 6 weeks- no lifting over 15 lb, pelvic rest  DVT prevention reviewed  Endometritis signs reviewed  Mastitis signs reviewed  PPD symptoms reviewed   Check GTT 2 hour (if gestational)- 6- 12 weeks pp   SIDs prevention reviewed   Contraception choices reviewed-BC mini pill& condoms, IUD,  Vasectomy - depo provera injection script sent discussed possible adverse effects with long term depo provera- bone density loss  RVannual (PAP)/ med check 3 months      Orders Placed This Encounter   Procedures    Glucose Tolerance, 2 Hours     Standing Status:   Future     Standing Expiration Date:   4/11/2023     Orders Placed This Encounter   Medications    medroxyPROGESTERone (DEPO-PROVERA) 150 MG/ML injection     Sig: Inject 1 mL into the muscle once for 1 dose Schedule injections every 12 weeks for birth control. Dispense:  1 mL     Refill:  1       Patient given educational materials - seepatient instructions. Discussed use, benefit, and side effects of prescribed medications. All patient questions answered. Pt voiced understanding. Reviewed health maintenance. Instructed to continue current medications, diet and exercise. Patient agreedwith treatment plan. Follow up as directed. Electronically signed by FIDEL Novoa CNP on 4/11/2022at 12:26 PM        Of the 20 minute duration appointment visit, Marita Davenport CNP spent at least 50% of the face-to-face time in counseling, explanation of diagnosis, planning of further management, and answering all questions.

## 2022-04-11 NOTE — PATIENT INSTRUCTIONS
Depression After Childbirth: Care Instructions  Your Care Instructions    Many women get the \"baby blues\" during the first few days after childbirth. You may lose sleep, feel irritable, and cry easily. You may feel happy one minute and sad the next. Hormone changes are one cause of these emotional changes. Also, the demands of a new baby, along with visits from relatives or other family needs, add to a mother's stress. The \"baby blues\" often peak around the fourth day. Then they ease up in less than 2 weeks. If your moodiness or anxiety lasts for more than 2 weeks, or if you feel like life is not worth living, you may have postpartum depression. This is different for each mother. Some mothers with serious depression may worry intensely about their infant's well-being. Others may feel distant from their child. Some mothers might even feel that they might harm their baby. A mother may have signs of paranoia, wondering if someone is watching her. Depression is not a sign of weakness. It is a medical condition that requires treatment. Medicine and counseling often work well to reduce depression. Talk to your doctor about taking antidepressant medicine while breastfeeding. Follow-up care is a key part of your treatment and safety. Be sure to make and go to all appointments, and call your doctor if you are having problems. It's also a good idea to know your test results and keep a list of the medicines you take. How do you know if you are depressed? With all the changes in your life, you may not know if you are depressed. Pregnancy sometimes causes changes in how you feel that are similar to the symptoms of depression. Symptoms of depression include:  · Feeling sad or hopeless and losing interest in daily activities. These are the most common symptoms of depression. · Sleeping too much or not enough. · Feeling tired. You may feel as if you have no energy. · Eating too much or too little.   · Writing or talking about death, such as writing suicide notes or talking about guns, knives, or pills. Keep the numbers for these national suicide hotlines: 0-471-979-TALK (6-874.787.1414) and 8-801-IVVETAZ (1-586.783.5959). If you or someone you know talks about suicide or feeling hopeless, get help right away. How can you care for yourself at home? · Be safe with medicines. Take your medicines exactly as prescribed. Call your doctor if you think you are having a problem with your medicine. · Eat a healthy diet so that you can keep up your energy. · Get regular daily exercise, such as walks, to help improve your mood. · Get as much sunlight as possible. Keep your shades and curtains open. Get outside as much as you can. · Avoid using alcohol or other substances to feel better. · Get as much rest and sleep as possible. Avoid doing too much. Being too tired can increase depression. · Play stimulating music throughout your day and soothing music at night. · Schedule outings and visits with friends and family. Ask them to call you regularly, so that you do not feel alone. · Ask for help with preparing food and other daily tasks. Family and friends are often happy to help a mother with a . · Be honest with yourself and those who care about you. Tell them about your struggle. · Join a support group of new mothers. No one can better understand the challenges of caring for a  than other new mothers. · If you feel like life is not worth living or are feeling hopeless, get help right away. Keep the numbers for these national suicide hotlines: -TALK (1-586.108.6362) and 0-199-KUVRTLS (9-772.820.1105). When should you call for help? Call 911 anytime you think you may need emergency care. For example, call if:    · You feel you cannot stop from hurting yourself, your baby, or someone else. Call your doctor now or seek immediate medical care if:    · You are having trouble caring for yourself or your baby. · You hear voices. Watch closely for changes in your health, and be sure to contact your doctor if:    · You have problems with your depression medicine. · You do not get better as expected. Where can you learn more? Go to https://shahid.MValve technologies. org and sign in to your eVigilo account. Enter D023 in the [facility] box to learn more about \"Depression After Childbirth: Care Instructions. \"     If you do not have an account, please click on the \"Sign Up Now\" link. Current as of: September 11, 2018  Content Version: 12.0  © 5292-3591 Trippy. Care instructions adapted under license by [facility] ([facility]). If you have questions about a medical condition or this instruction, always ask your healthcare professional. [address] any warranty or liability for your use of this information. Preeclampsia: Care Instructions  Overview    Preeclampsia occurs when a woman's blood pressure rises during pregnancy. Often with preeclampsia, you also have swelling in your legs, hands, and face. A test may show too much protein in your urine. Preeclampsia is also called toxemia. If preeclampsia is severe and not treated, it can lead to seizures (eclampsia) and damage to your liver or kidneys. Preeclampsia can prevent your baby from getting enough food and oxygen. This can cause a low birth weight or other problems. Your doctor will watch you closely to prevent these problems. He or she also may recommend that you reduce your activity. If your preeclampsia is a danger to your health or the health of your baby, your doctor may need to deliver your baby early. While preeclampsia is a concern, most women with preeclampsia have healthy babies. After a woman gives birth, preeclampsia usually goes away on its own. But symptoms may last a few weeks or more and can get worse after delivery.  Rarely, symptoms of preeclampsia don't show up until days or even weeks after childbirth. Follow-up care is a key part of your treatment and safety. Be sure to make and go to all appointments, and call your doctor if you are having problems. It's also a good idea to know your test results and keep a list of the medicines you take. How can you care for yourself at home? · Take and record your blood pressure at home if your doctor tells you to. ? Learn the importance of the two measures of blood pressure (such as 120 over 80, or 120/80). The first number is the systolic pressure, which is the force of blood on the artery walls as the heart pumps. The second number is the diastolic pressure, which is the force of blood on the artery walls between heartbeats, when the heart is at rest. You have a choice of monitors to use. ? Manual monitor: You pump up the cuff and use a stethoscope to listen for your pulse. ? Electronic monitor: The cuff inflates, and a gauge shows your pulse rate. ? To take your blood pressure:  ? Ask your doctor to check your blood pressure monitor to be sure that it is accurate and that the cuff fits you. Also ask your doctor to watch you to make sure that you are using it right. ? You should not eat, use tobacco products, or use medicine known to raise blood pressure (such as some nasal decongestant sprays) before you take your blood pressure. ? Avoid taking your blood pressure if you have just exercised. Also avoid taking it if you are nervous or upset. Rest at least 15 minutes before you take your blood pressure. · If your doctor advises, check the protein levels in your urine. Your doctor or nurse will show you how to do this. · Take your medicines exactly as prescribed. Call your doctor if you think you are having a problem with your medicine. · Do not smoke. Quitting smoking will help improve your baby's growth and health. If you need help quitting, talk to your doctor about stop-smoking programs and medicines.  These can increase your chances of quitting for good. · Eat a balanced and healthy diet that has lots of fruits and vegetables. · If your doctor advised bed rest, be sure to stay off your feet and rest as much as possible. ? Keep a phone, phone book, notepad, and pen near the bed where you can easily reach them. ? Gently stretch your legs every hour to maintain good blood flow. ? Have another family member pack snacks and lunch food in a cooler close to your bed. ? Use this time for activities that you usually cannot find time for, such as reading, craft projects, or letter writing. · You can keep track of your baby's health by noting the length of time it takes to count 10 movements (such as kicks, flutters, or rolls). Feeling 10 movements in less than 1 hour is considered normal. Track your baby's movements once each day. Bring this record with you to each prenatal visit. When should you call for help? Call 911 anytime you think you may need emergency care. For example, call if:    · You passed out (lost consciousness). · You have a seizure. Call your doctor now or seek immediate medical care if:    · You have symptoms of preeclampsia, such as:  ? Sudden swelling of your face, hands, or feet. ? New vision problems (such as dimness or blurring). ? A severe headache. · Your blood pressure is higher than it should be, or it rises suddenly. · You have new nausea or vomiting. · You think that you are in labor. · You have pain in your belly or pelvis. Watch closely for changes in your health, and be sure to contact your doctor if:    · You gain weight rapidly. Where can you learn more? Go to https://Mint Labsdianneeb.IroFit. org and sign in to your "[employer]" account. Enter H818 in the TalkMarkets box to learn more about \"Preeclampsia: Care Instructions. \"     If you do not have an account, please click on the \"Sign Up Now\" link.   Current as of: September 5, 2018  Content Version: 12.0  © 7382-3127 Healthwise, Incorporated. Care instructions adapted under license by [facility] ([facility]). If you have questions about a medical condition or this instruction, always ask your healthcare professional. [address] any warranty or liability for your use of this information. Mastitis: Care Instructions  Your Care Instructions  Mastitis is an inflammation of the breast. It occurs most often in women who are breastfeeding, but it can affect any woman. Mastitis can be caused by poor milk flow from the breast. When milk builds up in a breast, it leaks into the nearby breast tissue. Infection can also develop when the nipples become cracked or irritated. The tissue can then become infected with bacteria. Antibiotics can usually cure mastitis. For women who are nursing, continued breastfeeding (or pumping) can help. If mastitis is not treated, a pocket of pus may form in the breast and need to be drained. Follow-up care is a key part of your treatment and safety. Be sure to make and go to all appointments, and call your doctor if you are having problems. It's also a good idea to know your test results and keep a list of the medicines you take. How can you care for yourself at home? · If your doctor prescribed antibiotics, take them as directed. Do not stop taking them just because you feel better. You need to take the full course of antibiotics. · If you are breastfeeding, continue breastfeeding or pumping breast milk. It is important to empty your breasts regularly, every 2 to 3 hours while you are awake. These tips may help:  ? Before breastfeeding, place a warm, wet washcloth over your breast for about 15 minutes. Try this at least 3 times a day. This increases milk flow in the breast. Massaging the affected breast may also increase milk flow. ? Breastfeed on both sides.  Try to start with your healthy breast first. Then, after your milk is flowing, breastfeed from the affected breast until it feels soft. You should empty this breast completely. Then switch back to the healthy breast and breastfeed until your baby has finished. ? Pump or hand-express a small amount of breast milk before breastfeeding if your breasts are too full with milk. This will make your breasts less full and may make it easier for your baby to latch on to your breast.  ? Pump or express milk from the affected breast if it hurts too much to breastfeed. · Take an over-the-counter pain medicine, such as acetaminophen (Tylenol) or ibuprofen (Advil, Motrin) to relieve pain and fever. Read and follow all instructions on the label. · Do not take two or more pain medicines at the same time unless the doctor told you to. Many pain medicines have acetaminophen, which is Tylenol. Too much acetaminophen (Tylenol) can be harmful. · Rest as much as possible. · Drink extra fluids. · If pus is draining from your infected breast, wash the nipple gently and let it air-dry before you put your bra back on. A disposable breast pad placed in the bra cup may absorb the pus. · Sometimes, a blocked nipple pore, called a milk blister (or bleb) happens. This causes milk to back up in the breast. It can cause mastitis, so it's important to treat this if it happens. A milk blister is often a white dot on your nipple that can be painful. If a milk blister is causing you pain, it may help to place a warm, wet washcloth over the blister before breastfeeding or pumping. If this doesn't clear the blockage, your doctor can open the blister using a sterile needle. When should you call for help? Call your doctor now or seek immediate medical care if:    · You have worse symptoms of a breast infection, such as:  ? Increased pain, swelling, redness, or warmth around a breast.  ? Red streaks leading from a breast.  ? Pus draining from a breast.  ? A fever.     Watch closely for changes in your health, and be sure to contact your doctor if:    · You do not get better as expected. Where can you learn more? Go to https://chpepiceweb.AdiCyte. org and sign in to your Artspace account. Enter Y207 in the Mindscorehire box to learn more about \"Mastitis: Care Instructions. \"     If you do not have an account, please click on the \"Sign Up Now\" link. Current as of: September 5, 2018  Content Version: 12.0  © 8240-5668 Personal. Care instructions adapted under license by [facility] ([facility]). If you have questions about a medical condition or this instruction, always ask your healthcare professional. [address] any warranty or liability for your use of this information. Learning About Safe Sleep for Babies  Why is safe sleep important? Enjoy your time with your baby, and know that you can do a few things to keep your baby safe. Following safe sleep guidelines can help prevent sudden infant death syndrome (SIDS) and reduce other sleep-related risks. SIDS is the death of a baby younger than 1 year with no known cause. Talk about these safety steps with your  providers, family, friends, and anyone else who spends time with your baby. Explain in detail what you expect them to do. Do not assume that people who care for your baby know these guidelines. What are the tips for safe sleep? Putting your baby to sleep  · Put your baby to sleep on his or her back, not on the side or tummy. This reduces the risk of SIDS. · Once your baby learns to roll from the back to the belly, you do not need to keep shifting your baby onto his or her back. But keep putting your baby down to sleep on his or her back. · Keep the room at a comfortable temperature so that your baby can sleep in lightweight clothes without a blanket. Usually, the temperature is about right if an adult can wear a long-sleeved T-shirt and pants without feeling cold. Make sure that your baby doesn't get too warm.  Your baby is likely too warm if he or she sweats or tosses and turns a lot. · Think about giving your baby a pacifier at nap time and bedtime if your doctor agrees. If your baby is , experts recommend waiting 3 or 4 weeks until breastfeeding is going well before offering a pacifier. · The American Academy of Pediatrics recommends that you do not sleep with your baby in the bed with you. · When your baby is awake and someone is watching, allow your baby to spend some time on his or her belly. This helps your baby get strong and may help prevent flat spots on the back of the head. Cribs, cradles, bassinets, and bedding  · For the first 6 months, have your baby sleep in a crib, cradle, or bassinet in the same room where you sleep. · Keep soft items and loose bedding out of the crib. Items such as blankets, stuffed animals, toys, and pillows could block your baby's mouth or trap your baby. Dress your baby in sleepers instead of using blankets. · Make sure that your baby's crib has a firm mattress (with a fitted sheet). Don't use sleep positioners, bumper pads, or other products that attach to crib slats or sides. They could block your baby's mouth or trap your baby. · Do not place your baby in a car seat, sling, swing, bouncer, or stroller to sleep. The safest place for a baby is in a crib, cradle, or bassinet that meets safety standards. What else is important to know? More about sudden infant death syndrome (SIDS)  SIDS is very rare. In most cases, a parent or other caregiver puts the baby--who seems healthy--down to sleep and returns later to find that the baby has . No one is at fault when a baby dies of SIDS. A SIDS death cannot be predicted, and in many cases it cannot be prevented. Doctors do not know what causes SIDS. It seems to happen more often in premature and low-birth-weight babies. It also is seen more often in babies whose mothers did not get medical care during the pregnancy and in babies whose mothers smoke.   Do not smoke or let anyone else smoke in the house or around your baby. Exposure to smoke increases the risk of SIDS. If you need help quitting, talk to your doctor about stop-smoking programs and medicines. These can increase your chances of quitting for good. Breastfeeding your child may help prevent SIDS. Be wary of products that are billed as helping prevent SIDS. Talk to your doctor before buying any product that claims to reduce SIDS risk. What to do while still pregnant  · See your doctor regularly. Women who see a doctor early in and throughout their pregnancies are less likely to have babies who die of SIDS. · Eat a healthy, balanced diet, which can help prevent a premature baby or a baby with a low birth weight. · Do not smoke or let anyone else smoke in the house or around you. Smoking or exposure to smoke during pregnancy increases the risk of SIDS. If you need help quitting, talk to your doctor about stop-smoking programs and medicines. These can increase your chances of quitting for good. · Do not drink alcohol or take illegal drugs. Alcohol or drug use may cause your baby to be born early. Follow-up care is a key part of your child's treatment and safety. Be sure to make and go to all appointments, and call your doctor if your child is having problems. It's also a good idea to know your child's test results and keep a list of the medicines your child takes. Where can you learn more? Go to https://chpepallavieweb.healthOralWise. org and sign in to your Solarcentury account. Enter O869 in the [facility] box to learn more about \"Learning About Safe Sleep for Babies. \"     If you do not have an account, please click on the \"Sign Up Now\" link. Current as of: December 12, 2018  Content Version: 12.0  © 4851-2721 Healthwise, Incorporated. Care instructions adapted under license by [facility] ([facility]).  If you have questions about a medical condition or this instruction, always ask your healthcare professional. [address] any warranty or liability for your use of this information. Patient Education        Learning About Birth Control: The Shot  What is the shot? The shot is used to prevent pregnancy. You get the shot in your upper arm or rear end (buttocks). The shot gives you a dose of the hormone progestin. Theshot is often called by its brand name, Depo-Provera. Progestin prevents pregnancy in these ways: It thickens the mucus in the cervix. This makes it hard for sperm to travel into the uterus. It also thins the lining of the uterus, which makes it harder for a fertilized egg to attach to the uterus. Progestin can sometimes stop the ovaries from releasing an eggeach month (ovulation). The shot provides birth control for 3 months at a time. You then need anothershot. The shot may cause bone loss. Talk to your doctor about the risks and benefits. How well does it work? In the first year of use:   When the shot is used exactly as directed, fewer than 1 woman out of 100 has an unplanned pregnancy.  When the shot is not used exactly as directed, 6 women out of 100 have an unplanned pregnancy. Be sure to tell your doctor about any health problems you have or medicines you take. He or she can help you choose the birth control method that is right foryou. What are the advantages of the shot?  The shot is one of the most effective methods of birth control.  It's convenient. You need to get a shot only once every 3 months to prevent pregnancy. You don't have to interrupt sex to protect against pregnancy.  It prevents pregnancy for 3 months at a time. You don't have to worry about birth control for this time.  It's safe to use while breastfeeding.  The shot may reduce heavy bleeding and cramping.  The shot doesn't contain estrogen.  So you can use it if you don't want to take estrogen or can't take estrogen because you have certain health problems or concerns. What are the disadvantages of the shot?  The shot doesn't protect against sexually transmitted infections (STIs), such as herpes or HIV/AIDS. If you aren't sure if your sex partner might have an STI, use a condom to protect against disease.  The shot may cause bone loss in some women. Talk to your doctor about the risks and benefits.  The shot is needed every 3 months. Any side effects may last 3 months or longer. ? The shot may cause irregular periods, or you may have spotting between periods. You may also stop getting a period. Some women see having no period as an advantage. ? It may cause mood changes, less interest in sex, or weight gain.  If you want to get pregnant, it may take up to 18 months after you stop getting the shot. This is because the hormones the shot provided have to leave your system, and your body has to readjust.  Where can you learn more? Go to https://Simperiumdianneeb.healthCellTran. org and sign in to your Envoy [employer]rapeutics account. Enter R317 in the EcoNova box to learn more about \"Learning About Birth Control: The Shot. \"     If you do not have an account, please click on the \"Sign Up Now\" link. Current as of: June 16, 2021               Content Version: 13.2  © 2006-2022 Healthwise, Incorporated. Care instructions adapted under license by [facility] ([facility]). If you have questions about a medical condition or this instruction, always ask your healthcare professional. [place] [address] any warranty or liability for your use of this information.

## 2022-08-16 ENCOUNTER — HOSPITAL ENCOUNTER (OUTPATIENT)
Age: 22
Setting detail: SPECIMEN
Discharge: HOME | End: 2022-08-16

## 2022-08-16 ENCOUNTER — OFFICE VISIT (OUTPATIENT)
Dept: OBGYN CLINIC | Age: 22
End: 2022-08-16
Payer: MEDICAID

## 2022-08-16 VITALS
SYSTOLIC BLOOD PRESSURE: 120 MMHG | BODY MASS INDEX: 28.34 KG/M2 | HEIGHT: 68 IN | WEIGHT: 187 LBS | DIASTOLIC BLOOD PRESSURE: 60 MMHG | RESPIRATION RATE: 14 BRPM

## 2022-08-16 DIAGNOSIS — N89.8 VAGINAL ITCHING: ICD-10-CM

## 2022-08-16 DIAGNOSIS — N89.8 VAGINAL DISCHARGE: ICD-10-CM

## 2022-08-16 DIAGNOSIS — Z01.419 ENCOUNTER FOR ANNUAL ROUTINE GYNECOLOGICAL EXAMINATION: Primary | ICD-10-CM

## 2022-08-16 LAB
CANDIDA SPECIES, DNA PROBE: NEGATIVE
GARDNERELLA VAGINALIS, DNA PROBE: POSITIVE
SOURCE: ABNORMAL
TRICHOMONAS VAGINALIS DNA: NEGATIVE

## 2022-08-16 PROCEDURE — 99395 PREV VISIT EST AGE 18-39: CPT | Performed by: OBSTETRICS & GYNECOLOGY

## 2022-08-16 ASSESSMENT — PATIENT HEALTH QUESTIONNAIRE - PHQ9
SUM OF ALL RESPONSES TO PHQ QUESTIONS 1-9: 0
2. FEELING DOWN, DEPRESSED OR HOPELESS: 0
SUM OF ALL RESPONSES TO PHQ QUESTIONS 1-9: 0
SUM OF ALL RESPONSES TO PHQ QUESTIONS 1-9: 0
SUM OF ALL RESPONSES TO PHQ9 QUESTIONS 1 & 2: 0
1. LITTLE INTEREST OR PLEASURE IN DOING THINGS: 0
SUM OF ALL RESPONSES TO PHQ QUESTIONS 1-9: 0

## 2022-08-16 NOTE — PROGRESS NOTES
History and Physical    Manuela Soulier Long  2022              21 y.o. Chief Complaint   Patient presents with    Gynecologic Exam       Patient's last menstrual period was 2022. Primary Care Physician: Valeria Kebede MD    The patient was seen and examined. She has no chief complaint today and is here for her annual exam.  Her bowels are regular. There are no voiding complaints. She denies any bloating. She denies vaginal discharge and was counseled on STD's and the need for barrier contraception. HPI : Makeda Murillo is a 24 y.o. female     She is feeling well without complaint and she would like to start OCP. She was ordered Depo, but never started this. She has no other concerns at this time. She would like pelvic cultures today. no Bloating  no Early Satiety  no Unexplained weight change of more than 15 lbs  no  PMB  no  PCB  ________________________________________________________________________  OB History    Para Term  AB Living   2 2 2 0 0 2   SAB IAB Ectopic Molar Multiple Live Births   0 0 0 0 0 2      # Outcome Date GA Lbr Saul/2nd Weight Sex Delivery Anes PTL Lv   2 Term 22 37w4d 08:33 / 00:08 7 lb 3.7 oz (3.28 kg) M Vag-Spont EPI N JOSUÉ      Name: Jana Gather: 8  Apgar5: 9   1 Term 19 38w5d  8 lb 4 oz (3.742 kg) M Vag-Spont   JOSUÉ     Past Medical History:   Diagnosis Date    ADHD (attention deficit hyperactivity disorder)     Anxiety     Preeclampsia w/o SF (G2) 3/4/2022    Met criteria with elevated blood pressures and proteinuria      M Apg 8/9 Wt 7#3 3/4/2022    Urinary reflux                                                                    Past Surgical History:   Procedure Laterality Date    TONSILLECTOMY      TYMPANOSTOMY TUBE PLACEMENT       No family history on file.   Social History     Socioeconomic History    Marital status: Single     Spouse name: Not on file    Number of children: Not on file    Years of education: Not on file    Highest education level: Not on file   Occupational History    Not on file   Tobacco Use    Smoking status: Never    Smokeless tobacco: Never   Vaping Use    Vaping Use: Never used   Substance and Sexual Activity    Alcohol use: No    Drug use: No    Sexual activity: Yes   Other Topics Concern    Not on file   Social History Narrative    Not on file     Social Determinants of Health     Financial Resource Strain: Not on file   Food Insecurity: Not on file   Transportation Needs: Not on file   Physical Activity: Not on file   Stress: Not on file   Social Connections: Not on file   Intimate Partner Violence: Not on file   Housing Stability: Not on file       MEDICATIONS:  Current Outpatient Medications   Medication Sig Dispense Refill    ferrous sulfate (IRON 325) 325 (65 Fe) MG tablet Take 1 tablet by mouth daily (with breakfast) 90 tablet 1     No current facility-administered medications for this visit. ALLERGIES:  Allergies as of 08/16/2022    (No Known Allergies)       Symptoms of decreased mood absent    Immunization status: up to date and documented. Gynecologic History:       Patient's last menstrual period was 08/03/2022. Sexually Active: Yes    STD History: No     Permanent Sterilization: No   Reversible Birth Control: No        Hormone Replacement Exposure: No      Genetic Qualified Family History of Breast, Ovarian , Colon or Uterine Cancer: No     If YES see scanned worksheet.     Preventative Health Testing:    Health Maintenance:  Health Maintenance Due   Topic Date Due    COVID-19 Vaccine (1) Never done    Varicella vaccine (1 of 2 - 2-dose childhood series) Never done    Depression Screen  Never done    HIV screen  Never done    Pap smear  Never done       Date of Last Pap Smear: none  Abnormal Pap Smear History: none  Colposcopy History:   Date of Last Mammogram: none  Date of Last Colonoscopy:   Date of Last Bone Density:      ________________________________________________________________________        REVIEW OF SYSTEMS:       A minimum of an eleven point review of systems was completed. Review Of Systems (11 point):  Constitutional: No fever, chills or malaise; No weight change or fatigue  Head and Eyes: No vision changes, Headache, Dizziness or trauma in last 12 months  ENT ROS: No hearing, Tinnitis, sinus or taste problems  Hematological and Lymphatic ROS:No Lymphoma, Von Willebrand's, Hemophillia or Bleeding History  Psych ROS: No Depression, Homicidal thoughts,suicidal thoughts, or anxiety  Breast ROS: No breast abnormalities or lumps  Respiratory ROS: No SOB, Pneumoniae,Cough, or Pulmonary Embolism   Cardiovascular ROS: No Chest Pain with Exertion, Palpitations, Syncope, Edema, Arrhythmia  Gastrointestinal ROS: No Indigestion, Heartburn, Nausea, vomiting, Diarrhea, Constipation,or Bowel Changes; No Bloody Stools or melena  Genito-Urinary ROS: No Dysuria, Hematuria or Nocturia. No Urinary Incontinence or Vaginal Discharge  Musculoskeletal ROS: No Arthralgia, Arthritis,Gout,Osteoporosis or Rheumatism  Neurological ROS: No CVA, Migraines, Epilepsy, Seizure Hx, or Limb Weakness  Dermatological ROS: No Rash, Itching, Hives, Mole Changes or Cancer                                                                                                                                                                                                                                  PHYSICAL Exam:     Constitutional:  Vitals:    08/16/22 1555   BP: 120/60   Site: Right Upper Arm   Position: Sitting   Cuff Size: Small Adult   Resp: 14   Weight: 187 lb (84.8 kg)   Height: 5' 7.5\" (1.715 m)       Chaperone for Intimate Exam  Chaperone was offered and accepted as part of the rooming process. Chaperone: Brooke Buenrostro          General Appearance: This  is a well Developed, well Nourished, well groomed female. Her BMI was reviewed. Nutritional decision making was discussed. Skin:  There was a Normal Inspection of the skin without rashes or lesions. There were no rashes. Lymphatic:  No Lymph Nodes were Palpable in the neck , axilla or groin.  0 # Of Lymph Nodes     Neck and EENT:  The neck was supple. There were no masses   The thyroid was not enlarged and had no masses. EOMI B/L    Respiratory: The lungs were auscultated and found to be clear. There were no rales, rhonchi or wheezes. There was a good respiratory effort. Cardiovascular: The heart was in a regular rate and rhythm. . No S3 or S4. There was no murmur appreciated. Location, grade, and radiation are not applicable. Extremities: The patients extremities were without calf tenderness, edema, or varicosities. There was full range of motion in all four extremities. Pulses in all four extremities were appreciated and are 2/4. Abdomen: The abdomen was soft and non-tender. There were good bowel sounds in all quadrants and there was no guarding, rebound or rigidity. Abdominal Scars:     Psych: The patient had a normal Orientation to: Time, Place, Person, and Situation  There is no Mood / Affect changes    Breast:  (Chest)  normal appearance, no masses or tenderness  Self breast exams were reviewed in detail. Literature was given. Pelvic Exam:  External genitalia: normal general appearance  Urinary system: urethral meatus normal  Vaginal: normal mucosa without prolapse or lesions  Cervix: normal appearance  Adnexa: normal bimanual exam  Uterus: normal single, nontender    Rectal Exam:  exam declined by patient          Musculosk:  Normal Gait and station was noted. Digits were evaluated without abnormal findings. Range of motion, stability and strength were evaluated and found to be appropriate for the patients age. ASSESSMENT:      24 y.o. Annual   Diagnosis Orders   1. Encounter for annual routine gynecological examination  PAP SMEAR      2. Vaginal discharge  Vaginitis DNA Probe      3. Vaginal itching  Vaginitis DNA Probe             Chief Complaint   Patient presents with    Gynecologic Exam          Past Medical History:   Diagnosis Date    ADHD (attention deficit hyperactivity disorder)     Anxiety     Preeclampsia w/o SF (G2) 3/4/2022    Met criteria with elevated blood pressures and proteinuria      M Apg 8/9 Wt 7#3 3/4/2022    Urinary reflux          Patient Active Problem List    Diagnosis Date Noted    Encounter for care and examination of mother immediately after delivery      3/4/22 M Apg 8/9 Wt 7#3 2022    Preeclampsia w/o SF (G2) 2022     Met criteria with elevated blood pressures and proteinuria       Late prenatal care 2022    Rh Negative  2022     Rhogam received 22      FHx Cleft Palate 01/10/2022    Elv 1hr GTT, no 3hr GTT 01/10/2022    Hx GDMA1 (G1) 01/10/2022    Tobacco use 01/10/2022    Late transfer of care 01/10/2022     Transfer of care from [address] [place] [address] OB at 29 wks            Hereditary Breast, Ovarian, Colon and Uterine Cancer screening Done. Tobacco & Secondary smoke risks reviewed; instructed on cessation and avoidance      Counseling Completed:  Preventative Health Recommendations and Follow up. PLAN:  Return in about 3 months (around 2022) for OCP check up/BP check. Repeat Annual every 1 year  Cervical Cytology Evaluation begins at 24years old. If Negative Cytology, Follow-up screening per current guidelines. Birth control and barrier recommendations discussed. STD counseling and prevention reviewed. Gardisil counseling completed for all patients 10-37 yo. Routine health maintenance per patients PCP. Orders Placed This Encounter   Procedures    Vaginitis DNA Probe    PAP SMEAR     Patient History:    No LMP recorded.   OBGYN Status: Unknown  Past Surgical History:  No date: TONSILLECTOMY  No date: TYMPANOSTOMY TUBE PLACEMENT  Medications/Contraceptives Affecting Cytology     Progestin Contraceptives - Injectable Disp Start End     medroxyPROGESTERone (DEPO-PROVERA) 150 MG/ML injection    1 mL 4/11/2022 4/11/2022    Sig: Inject 1 mL into the muscle once for 1 dose Schedule injections   every 12 weeks for birth control. Route: IntraMUSCular       Social History    Tobacco Use      Smoking status: Never      Smokeless tobacco: Never       Standing Status:   Future     Standing Expiration Date:   8/17/2023     Order Specific Question:   Collection Type     Answer: Thin Prep     Order Specific Question:   Prior Abnormal Pap Test     Answer:   No     Order Specific Question:   Screening or Diagnostic     Answer:   Screening     Order Specific Question:   HPV Requested? Answer:   Yes -  If ASCUS Reflex HPV     Order Specific Question:   High Risk Patient     Answer:   N/A           The patient, Beth Castelan is a 24 y.o. female, was seen with a total time spent of 20 minutes for the visit on this date of service by the E/M provider. The time component had both face to face and non face to face time spent in determining the total time component. Counseling and education regarding her diagnosis listed below and her options regarding those diagnoses were also included in determining her time component. Diagnosis Orders   1. Encounter for annual routine gynecological examination  PAP SMEAR      2. Vaginal discharge  Vaginitis DNA Probe      3. Vaginal itching  Vaginitis DNA Probe           The patient had her preventative health maintenance recommendations and follow-up reviewed with her at the completion of her visit.

## 2022-08-23 LAB — CYTOLOGY REPORT: NORMAL

## 2022-08-31 ENCOUNTER — TELEPHONE (OUTPATIENT)
Dept: OBGYN CLINIC | Age: 22
End: 2022-08-31

## 2022-08-31 NOTE — TELEPHONE ENCOUNTER
----- Message from Tala Shepherd DO sent at 8/28/2022  9:44 PM EDT -----  Please call and notify patient of normal pap smear. Follow up annually, prn, or as scheduled. She did have BV on her vaginitis probe and it doesn't look as if she was treated yet. Please offer her treatment with flagyl or solosec. Thank you.

## 2022-09-01 ENCOUNTER — HOSPITAL ENCOUNTER (EMERGENCY)
Age: 22
Discharge: HOME | End: 2022-09-01
Attending: SPECIALIST
Payer: MEDICAID

## 2022-09-01 ENCOUNTER — APPOINTMENT (OUTPATIENT)
Dept: GENERAL RADIOLOGY | Age: 22
End: 2022-09-01
Payer: MEDICAID

## 2022-09-01 VITALS
DIASTOLIC BLOOD PRESSURE: 85 MMHG | BODY MASS INDEX: 28.34 KG/M2 | OXYGEN SATURATION: 100 % | WEIGHT: 187 LBS | HEART RATE: 76 BPM | RESPIRATION RATE: 16 BRPM | TEMPERATURE: 98.4 F | SYSTOLIC BLOOD PRESSURE: 141 MMHG | HEIGHT: 68 IN

## 2022-09-01 DIAGNOSIS — R07.9 CHEST PAIN, UNSPECIFIED TYPE: ICD-10-CM

## 2022-09-01 DIAGNOSIS — J06.9 VIRAL URI: Primary | ICD-10-CM

## 2022-09-01 LAB
SARS-COV-2, RAPID: NOT DETECTED
SPECIMEN DESCRIPTION: NORMAL

## 2022-09-01 PROCEDURE — 99285 EMERGENCY DEPT VISIT HI MDM: CPT

## 2022-09-01 PROCEDURE — 87635 SARS-COV-2 COVID-19 AMP PRB: CPT

## 2022-09-01 PROCEDURE — 71045 X-RAY EXAM CHEST 1 VIEW: CPT

## 2022-09-01 PROCEDURE — 93005 ELECTROCARDIOGRAM TRACING: CPT

## 2022-09-01 ASSESSMENT — PAIN - FUNCTIONAL ASSESSMENT: PAIN_FUNCTIONAL_ASSESSMENT: NONE - DENIES PAIN

## 2022-09-01 NOTE — LETTER
[address] [facility] ED  [address] [facility] [address]  Phone: [phone]  Fax: [phone]               September 2, 2022    Patient: Herlinda Tian   YOB: 2000   Date of Visit: 9/1/2022       To Whom It May Concern:    Sahil Muhammad was seen and treated in our emergency department on 9/1/2022. She may return to work on 9/2/2022.       Sincerely,       kraig diaz RN         Signature:__________________________________

## 2022-09-02 ASSESSMENT — ENCOUNTER SYMPTOMS: COUGH: 1

## 2022-09-02 NOTE — DISCHARGE INSTRUCTIONS
PLEASE RETURN TO THE EMERGENCY DEPARTMENT IMMEDIATELY if your symptoms worsen in anyway or in 8-12 hours if not improved for re-evaluation. You should immediately return to the ER for symptoms such as increasing pain, shortness of breath, fever, a feeling of passing out, light headed, dizziness, abdominal pain, numbness or weakness to the arms or legs, coolness or color change of the arms or legs. Take your medication as indicated and prescribed. If you are given an antibiotic then, make sure you get the prescription filled and take the antibiotics until finished. Please understand that at this time there is no evidence for a more serious underlying process, but that early in the process of an illness or injury, an emergency department workup can be falsely reassuring. You should contact your family doctor within the next 24 hours for a follow up appointment    Lico Smyth!!!    From [address] and [facility] Emergency Services    On behalf of the Emergency Department staff at [address], I would like to thank you for giving us the opportunity to address your health care needs and concerns. We hope that during your visit, our service was delivered in a professional and caring manner. Please keep [address] in mind as we walk with you down the path to your own personal wellness. Please expect an automated text message or email from us so we can ask a few questions about your health and progress. Based on your answers, a clinician may call you back to offer help and instructions. Please understand that early in the process of an illness or injury, an emergency department workup can be falsely reassuring. If you notice any worsening, changing or persistent symptoms please call your family doctor or return to the ER immediately. Tell us how we did during your visit at [email or web address]   and let us know about your experience

## 2022-09-02 NOTE — ED PROVIDER NOTES
Tawanna Herman 1778 ENCOUNTER      Pt Name: Man Espinoza  MRN: [id number]  [place] 2000  Date of evaluation: 22      CHIEF COMPLAINT       Chief Complaint   Patient presents with    Nasal Congestion    Chest Pain     Pt describes as a pressure feeling mid chest that is intermittent-currently no pain         HISTORY OF PRESENT ILLNESS    Man Espinoza is a 24 y.o. female who presents to the emergency department for evaluation of intermittent substernal chest pain while walking to [facility] associated with stuffy nose, sore throat and nasal drainage. She is brought in via EMS. She admits to having shortness of breath during the chest pain episodes which last up to 5 minutes at a time. Upon arrival patient is pain-free. She denies any cough, fever or chills. She is non-smoker and denies any cocaine or illicit drug use or abuse. There are no exacerbating or relieving factors. REVIEW OF SYSTEMS       Review of Systems   HENT:  Positive for congestion. Respiratory:  Positive for cough. Cardiovascular:  Positive for chest pain. All other systems reviewed and are negative. PAST MEDICAL HISTORY    has a past medical history of ADHD (attention deficit hyperactivity disorder), Anxiety, Preeclampsia w/o SF (G2),  M Apg 8/9 Wt 7#3, and Urinary reflux. SURGICAL HISTORY      has a past surgical history that includes Tonsillectomy and Tympanostomy tube placement. CURRENT MEDICATIONS       Discharge Medication List as of 2022 11:28 PM        CONTINUE these medications which have NOT CHANGED    Details   ferrous sulfate (IRON 325) 325 (65 Fe) MG tablet Take 1 tablet by mouth daily (with breakfast), Disp-90 tablet, R-1Normal             ALLERGIES     has No Known Allergies. FAMILY HISTORY     has no family status information on file. family history is not on file. SOCIAL HISTORY      reports that she has been smoking cigarettes.  She has been smoking an average of .5 packs per day. She has never used smokeless tobacco. She reports that she does not drink alcohol and does not use drugs. PHYSICAL EXAM     INITIAL VITALS:  height is 5' 7.5\" (1.715 m) and weight is 84.8 kg (187 lb). Her oral temperature is 98.4 °F (36.9 °C). Her blood pressure is 141/85 (abnormal) and her pulse is 76. Her respiration is 16 and oxygen saturation is 100%. Physical Exam  Vitals and nursing note reviewed. Constitutional:       General: She is not in acute distress. Appearance: She is well-developed. HENT:      Head: Normocephalic and atraumatic. Nose: Nose normal.   Eyes:      Extraocular Movements: Extraocular movements intact. Pupils: Pupils are equal, round, and reactive to light. Cardiovascular:      Rate and Rhythm: Normal rate and regular rhythm. Heart sounds: Normal heart sounds. No murmur heard. Pulmonary:      Effort: Pulmonary effort is normal. No respiratory distress. Breath sounds: Normal breath sounds. Abdominal:      General: Bowel sounds are normal. There is no distension. Palpations: Abdomen is soft. Tenderness: There is no abdominal tenderness. Musculoskeletal:      Cervical back: Normal range of motion and neck supple. Skin:     General: Skin is warm and dry. Neurological:      General: No focal deficit present. Mental Status: She is alert and oriented to person, place, and time. Psychiatric:         Behavior: Behavior normal.         Thought Content:  Thought content normal.         DIFFERENTIAL DIAGNOSIS/ MDM:     Acute viral illness, nonspecific chest pain, musculoskeletal pain, pneumonia, COVID-19 infection, streptococcal pharyngitis    DIAGNOSTIC RESULTS     EKG: All EKG's are interpreted by the Emergency Department Physician who either signs or Co-signs this chart in the absence of a cardiologist.    EKG Interpretation    Interpreted by emergency department physician    Rhythm: Sinus rhythm, but Bradycardic  Rate: Bradycardic  Axis: normal  Conduction: normal  ST Segments: no acute change  T Waves: no acute change  Q Waves: no acute change    Clinical Impression: Sinus Bradycardia. RADIOLOGY:   Interpretation per the Radiologist below, if available at the time of this note:    XR CHEST PORTABLE    Result Date: 9/1/2022  EXAMINATION: Yuridia Quijano 349 9/1/2022 9:44 pm COMPARISON: None. HISTORY: ORDERING SYSTEM PROVIDED HISTORY: SOB TECHNOLOGIST PROVIDED HISTORY: SOB Reason for Exam: SOB FINDINGS: A single upright frontal view chest radiograph was obtained. The heart size, mediastinal contour, and pleural spaces are within normal limits. The lungs are clear. There is no focal consolidation or pneumothorax. The pulmonary vascular pattern is within normal limits. No significant thoracic osseous abnormality. Clear lungs. No acute cardiopulmonary abnormality. LABS:  Results for orders placed or performed during the hospital encounter of 09/01/22   COVID-19, Rapid    Specimen: Nasopharyngeal Swab   Result Value Ref Range    Specimen Description . NASOPHARYNGEAL SWAB     SARS-CoV-2, Rapid Not Detected Not Detected       COVID swab is negative    EMERGENCY DEPARTMENT COURSE:   Vitals:    Vitals:    09/01/22 2155   BP: (!) 141/85   Pulse: 76   Resp: 16   Temp: 98.4 °F (36.9 °C)   TempSrc: Oral   SpO2: 100%   Weight: 84.8 kg (187 lb)   Height: 5' 7.5\" (1.715 m)     -------------------------  BP: (!) 141/85, Temp: 98.4 °F (36.9 °C), Heart Rate: 76, Resp: 16    No orders of the defined types were placed in this encounter. During the ED stay, patient remained pain-free and is resting comfortably. Plan is to discharge the patient with instructions drink plenty of oral fluids Joana Shanti, take Tylenol and/or ibuprofen as needed for the pain or fever, follow-up with PCP, return if worse. CONSULTS:  None    PROCEDURES:  None    FINAL IMPRESSION      1. Viral URI    2.  Chest pain, unspecified type          DISPOSITION/PLAN       PATIENT REFERRED TO:  Lucia Cannon MD  [address]  [phone]    Call in 2 days  For reevaluation of current symptoms    [facility] ED  [address] [facility] [address]  [phone]    If symptoms worsen      DISCHARGE MEDICATIONS:  Discharge Medication List as of 9/1/2022 11:28 PM          (Please note that portions of this note were completed with a voice recognition program.  Efforts were made to edit the dictations but occasionally words are mis-transcribed.)    Rohith Vázquez MD,, MD, F.A.C.E.P.   Attending Emergency Medicine Physician       Rohith Vázquez MD  09/02/22 7639

## 2022-09-02 NOTE — ED NOTES
Pt brought by ems . C/o intermittent chest pressure when walking to [facility] tonight & runny nose. Pt stated that she had indigestion at the same time as the chest pressure. Currently not in any pain. RR are easy/unlabored, AOX4, PWD.       Marbella Martinez RN  09/01/22 4579

## 2022-09-06 LAB
EKG ATRIAL RATE: 59 BPM
EKG P AXIS: 18 DEGREES
EKG P-R INTERVAL: 158 MS
EKG Q-T INTERVAL: 358 MS
EKG QRS DURATION: 78 MS
EKG QTC CALCULATION (BAZETT): 354 MS
EKG R AXIS: 55 DEGREES
EKG T AXIS: 33 DEGREES
EKG VENTRICULAR RATE: 59 BPM

## 2022-09-06 RX ORDER — METRONIDAZOLE 500 MG/1
500 TABLET ORAL 2 TIMES DAILY
Qty: 14 TABLET | Refills: 0 | Status: SHIPPED | OUTPATIENT
Start: 2022-09-06 | End: 2022-09-13

## 2022-10-31 DIAGNOSIS — N92.6 MISSED PERIOD: Primary | ICD-10-CM

## 2022-11-03 ENCOUNTER — HOSPITAL ENCOUNTER (OUTPATIENT)
Age: 22
Setting detail: SPECIMEN
Discharge: HOME | End: 2022-11-03

## 2022-11-03 DIAGNOSIS — N92.6 MISSED PERIOD: ICD-10-CM

## 2022-11-03 LAB — HCG QUANTITATIVE: <1 MIU/ML

## 2022-11-08 ENCOUNTER — TELEPHONE (OUTPATIENT)
Dept: OBGYN CLINIC | Age: 22
End: 2022-11-08

## 2022-11-08 RX ORDER — MEDROXYPROGESTERONE ACETATE 150 MG/ML
150 INJECTION, SUSPENSION INTRAMUSCULAR ONCE
Qty: 1 ML | Refills: 3 | Status: SHIPPED | OUTPATIENT
Start: 2022-11-08 | End: 2022-11-08

## 2022-11-09 ENCOUNTER — TELEPHONE (OUTPATIENT)
Dept: OBGYN CLINIC | Age: 22
End: 2022-11-09

## 2022-11-09 RX ORDER — LEVONORGESTREL AND ETHINYL ESTRADIOL 0.1-0.02MG
KIT ORAL
Qty: 1 PACKET | Refills: 3 | Status: SHIPPED | OUTPATIENT
Start: 2022-11-09

## 2022-11-09 NOTE — TELEPHONE ENCOUNTER
Mom stopped in, she was given balcoltra samples, can we call in an RX?   HCG was negative, she missed a few pills and was worried about pregnancy

## 2022-11-09 NOTE — TELEPHONE ENCOUNTER
Per Mom, mom stopped in for another sample and then I told her we need to write the RX. She is not wanting the depo, and wants to stay on the pills.

## 2023-02-28 ENCOUNTER — OFFICE VISIT (OUTPATIENT)
Dept: OBGYN CLINIC | Age: 23
End: 2023-02-28
Payer: COMMERCIAL

## 2023-02-28 ENCOUNTER — HOSPITAL ENCOUNTER (OUTPATIENT)
Age: 23
Setting detail: SPECIMEN
Discharge: HOME | End: 2023-02-28

## 2023-02-28 VITALS — DIASTOLIC BLOOD PRESSURE: 64 MMHG | SYSTOLIC BLOOD PRESSURE: 126 MMHG | BODY MASS INDEX: 32.41 KG/M2 | WEIGHT: 210 LBS

## 2023-02-28 DIAGNOSIS — N89.8 VAGINAL ODOR: ICD-10-CM

## 2023-02-28 DIAGNOSIS — N92.0 MENORRHAGIA WITH REGULAR CYCLE: Primary | ICD-10-CM

## 2023-02-28 DIAGNOSIS — Z11.3 SCREEN FOR STD (SEXUALLY TRANSMITTED DISEASE): ICD-10-CM

## 2023-02-28 PROBLEM — Z72.0 TOBACCO USE: Status: RESOLVED | Noted: 2022-01-10 | Resolved: 2023-02-28

## 2023-02-28 PROCEDURE — 99213 OFFICE O/P EST LOW 20 MIN: CPT | Performed by: ADVANCED PRACTICE MIDWIFE

## 2023-02-28 PROCEDURE — G8427 DOCREV CUR MEDS BY ELIG CLIN: HCPCS | Performed by: ADVANCED PRACTICE MIDWIFE

## 2023-02-28 PROCEDURE — G8417 CALC BMI ABV UP PARAM F/U: HCPCS | Performed by: ADVANCED PRACTICE MIDWIFE

## 2023-02-28 PROCEDURE — 4004F PT TOBACCO SCREEN RCVD TLK: CPT | Performed by: ADVANCED PRACTICE MIDWIFE

## 2023-02-28 PROCEDURE — G8484 FLU IMMUNIZE NO ADMIN: HCPCS | Performed by: ADVANCED PRACTICE MIDWIFE

## 2023-02-28 RX ORDER — LEVONORGESTREL AND ETHINYL ESTRADIOL 0.1-0.02MG
KIT ORAL
Qty: 1 PACKET | Refills: 3 | Status: SHIPPED | OUTPATIENT
Start: 2023-02-28

## 2023-02-28 ASSESSMENT — ENCOUNTER SYMPTOMS
ABDOMINAL PAIN: 0
SHORTNESS OF BREATH: 0
DIARRHEA: 0
NAUSEA: 0
VOMITING: 0

## 2023-02-28 NOTE — PROGRESS NOTES
[address] OB/[facility]  [place]  [address] [address]  Dept: [phone]    Patient Name: Pascual Mercer  Patient Age: 25 y.o. Date of Visit: 2023    Subjective  Chief Complaint   Patient presents with    Vaginal Discharge    Vaginal Itching    Vaginal Bleeding     Patient's last menstrual period was 2023 (approximate). Chaperone for Intimate Exam  Chaperone was offered as part of the rooming process. Patient declined and agrees to continue with exam without a chaperone. Chaperone: n/a      HPI  Arrives a previously established patient to discuss change in her menstrual cycle including now having heavier flow than she was use to. Reports having monthly menstrual cycles. Reports went on birth control after the birth of her last child but only took it for 2-3 months than stopped. Reports is not sexually active, partner is in [place], [place]. Reports intermittent vaginal odor. No history of STDs, requesting STD screening today    She reports there is a personal history or family history of:    Smoking (> 15 cigs/day): No    Migraine with Aura:  No    HTN (> 160/100): No    DVT:  No    Thrombophilias:  No    Stroke (CVA): No     Ischemic heart disease:  No    Valvular heart disease (A Fib, Pul HTN, etc):  No    Positive Antiphospholipid Abs:  No    Liver Disease:  No      PHQ Scores 2022   PHQ2 Score 0   PHQ9 Score 0     Interpretation of Total Score Depression Severity: 1-4 = Minimal depression, 5-9 = Mild depression, 10-14 = Moderate depression, 15-19 = Moderately severe depression, 20-27 = Severe depression      OB History          2    Para   2    Term   2            AB        Living   2         SAB        IAB        Ectopic        Molar        Multiple   0    Live Births   2              Past Medical History:   Diagnosis Date    ADHD (attention deficit hyperactivity disorder)     Anxiety     Preeclampsia w/o SF (G2) 3/4/2022    Met criteria with elevated blood pressures and proteinuria      M Apg 8/9 Wt 7#3 3/4/2022    Urinary reflux      Current Outpatient Medications   Medication Sig Dispense Refill    Levonorgest-Eth Estrad-Fe Bisg (BALCOLTRA) 0.1-20 MG-MCG(21) TABS Take 1 tablet by mouth daily. 1 packet 3    ferrous sulfate (IRON 325) 325 (65 Fe) MG tablet Take 1 tablet by mouth daily (with breakfast) 90 tablet 1     No current facility-administered medications for this visit. Review of Systems   Constitutional:  Negative for unexpected weight change. Respiratory:  Negative for shortness of breath. Cardiovascular:  Negative for chest pain, palpitations and leg swelling. Gastrointestinal:  Negative for abdominal pain, diarrhea, nausea and vomiting. Genitourinary:  Positive for menstrual problem and vaginal discharge. Negative for difficulty urinating, dyspareunia and vaginal pain. Neurological:  Negative for dizziness, light-headedness and headaches. Objective  /64 (Site: Right Upper Arm, Position: Sitting, Cuff Size: Medium Adult)   Wt 210 lb (95.3 kg)   LMP 2023 (Approximate)   BMI 32.41 kg/m²     Physical Exam  Vitals reviewed. Constitutional:       General: She is not in acute distress. Appearance: She is well-developed. She is not diaphoretic. Neck:      Thyroid: No thyromegaly or thyroid tenderness. Cardiovascular:      Rate and Rhythm: Normal rate and regular rhythm. Heart sounds: Normal heart sounds. Pulmonary:      Effort: Pulmonary effort is normal. No respiratory distress. Breath sounds: Normal breath sounds. Abdominal:      General: There is no distension. Palpations: Abdomen is soft. Tenderness: There is no abdominal tenderness. Genitourinary:     Labia:         Right: No rash, tenderness or lesion. Left: No rash, tenderness or lesion. Vagina: Vaginal discharge present. No tenderness or bleeding.       Cervix: No cervical motion tenderness or friability. Musculoskeletal:         General: Normal range of motion. Cervical back: Normal range of motion. Skin:     General: Skin is warm and dry. Neurological:      Mental Status: She is alert and oriented to person, place, and time. Mental status is at baseline. Psychiatric:         Behavior: Behavior normal.         Thought Content: Thought content normal.         Judgment: Judgment normal.         Assessment & Plan  1. Menorrhagia with regular cycle  - Patient was educated to notify office and seek medical care if abdominal pain, chest pain, severe headaches, eye problems/loss of vision, or severe leg pain or swelling in the calf occurs (ACHES).  - NO contraindications noted at visit for combined oral contraception. Not currently sexually active reviewed quick start method. - Levonorgest-Eth Estrad-Fe Bisg (BALCOLTRA) 0.1-20 MG-MCG(21) TABS; Take 1 tablet by mouth daily. Dispense: 1 packet; Refill: 3    2. Vaginal odor  - Reviewed comfort measures to follow up after results   - Vaginitis DNA Probe; Future  - C.trachomatis N.gonorrhoeae DNA; Future    3. Screen for STD (sexually transmitted disease)  - Reviewed and reinforced safe sex practices. Declined hiv, syphilis and hepatitis c screening  - Vaginitis DNA Probe; Future  - C.trachomatis N.gonorrhoeae DNA; Future        The patient, Veronika Shaver , was seen with a total time spent of 25 minutes for the visit on this date of service by the [facility]  Both face-to-face (counseling and education) and non face-to-face time (care coordination), were spent in determining the total time component.      Return in about 1 year (around 2/28/2024) for Annual.    Electronically Signed FIDEL Tejada CNM

## 2023-03-01 ENCOUNTER — TELEPHONE (OUTPATIENT)
Dept: OBGYN CLINIC | Age: 23
End: 2023-03-01

## 2023-03-01 DIAGNOSIS — A59.9 TRICHIMONIASIS: Primary | ICD-10-CM

## 2023-03-01 LAB
C TRACH DNA SPEC QL PROBE+SIG AMP: NEGATIVE
CANDIDA SPECIES, DNA PROBE: NEGATIVE
GARDNERELLA VAGINALIS, DNA PROBE: NEGATIVE
N GONORRHOEA DNA SPEC QL PROBE+SIG AMP: NEGATIVE
SOURCE: ABNORMAL
SPECIMEN DESCRIPTION: NORMAL
TRICHOMONAS VAGINALIS DNA: POSITIVE

## 2023-03-01 RX ORDER — METRONIDAZOLE 500 MG/1
500 TABLET ORAL 2 TIMES DAILY
Qty: 14 TABLET | Refills: 0 | Status: SHIPPED | OUTPATIENT
Start: 2023-03-01 | End: 2023-03-08

## 2023-03-01 NOTE — TELEPHONE ENCOUNTER
----- Message from Wendy Combs sent at 3/1/2023  8:12 AM EST -----  Positive for trichomoniasis, flagyl sent to pharmacy awaiting chlamydia and gonorrhea

## 2023-03-01 NOTE — TELEPHONE ENCOUNTER
Pt was made aware of her results and recommendations- will call back to schedule IBAN in 3 months- pt aware to notify partner and or partners to be tested/treated.

## 2023-05-08 RX ORDER — FERROUS SULFATE 325(65) MG
TABLET ORAL
Qty: 90 TABLET | Refills: 1 | OUTPATIENT
Start: 2023-05-08

## 2023-05-26 ENCOUNTER — OFFICE VISIT (OUTPATIENT)
Dept: OBGYN CLINIC | Age: 23
End: 2023-05-26
Payer: COMMERCIAL

## 2023-05-26 ENCOUNTER — HOSPITAL ENCOUNTER (OUTPATIENT)
Age: 23
Setting detail: SPECIMEN
Discharge: HOME | End: 2023-05-26

## 2023-05-26 VITALS — WEIGHT: 217.13 LBS | SYSTOLIC BLOOD PRESSURE: 128 MMHG | BODY MASS INDEX: 33.5 KG/M2 | DIASTOLIC BLOOD PRESSURE: 70 MMHG

## 2023-05-26 DIAGNOSIS — Z86.19 HISTORY OF TRICHOMONIASIS: ICD-10-CM

## 2023-05-26 DIAGNOSIS — N89.8 VAGINAL ODOR: Primary | ICD-10-CM

## 2023-05-26 DIAGNOSIS — Z11.3 SCREEN FOR STD (SEXUALLY TRANSMITTED DISEASE): ICD-10-CM

## 2023-05-26 PROCEDURE — 4004F PT TOBACCO SCREEN RCVD TLK: CPT | Performed by: ADVANCED PRACTICE MIDWIFE

## 2023-05-26 PROCEDURE — G8427 DOCREV CUR MEDS BY ELIG CLIN: HCPCS | Performed by: ADVANCED PRACTICE MIDWIFE

## 2023-05-26 PROCEDURE — G8417 CALC BMI ABV UP PARAM F/U: HCPCS | Performed by: ADVANCED PRACTICE MIDWIFE

## 2023-05-26 PROCEDURE — 99213 OFFICE O/P EST LOW 20 MIN: CPT | Performed by: ADVANCED PRACTICE MIDWIFE

## 2023-05-26 ASSESSMENT — ENCOUNTER SYMPTOMS
ABDOMINAL PAIN: 0
DIARRHEA: 0
NAUSEA: 0
SHORTNESS OF BREATH: 0
VOMITING: 0

## 2023-05-26 NOTE — PROGRESS NOTES
C.trachomatis N.gonorrhoeae DNA; Future    2. History of trichomoniasis  - Reviewed and reinforced safe sex practices  - HIV Screen; Future  - T. pallidum Ab; Future  - Hepatitis C Antibody; Future  - Hepatitis B Surface Antigen; Future  - Vaginitis DNA Probe; Future  - C.trachomatis N.gonorrhoeae DNA; Future    3. Screen for STD (sexually transmitted disease)  - Reviewed and reinforced safe sex practices   - HIV Screen; Future  - T. pallidum Ab; Future  - Hepatitis C Antibody; Future  - Hepatitis B Surface Antigen; Future  - Vaginitis DNA Probe; Future  - C.trachomatis N.gonorrhoeae DNA; Future        The patient, Tobias Blood , was seen with a total time spent of 25 minutes for the visit on this date of service by the [facility]  Both face-to-face (counseling and education) and non face-to-face time (care coordination), were spent in determining the total time component. Return if symptoms worsen or fail to improve.     Electronically Signed byFIDEL Dee CNM

## 2023-05-27 DIAGNOSIS — N89.8 VAGINAL ODOR: ICD-10-CM

## 2023-05-27 DIAGNOSIS — Z11.3 SCREEN FOR STD (SEXUALLY TRANSMITTED DISEASE): ICD-10-CM

## 2023-05-27 DIAGNOSIS — Z86.19 HISTORY OF TRICHOMONIASIS: ICD-10-CM

## 2023-05-27 LAB
CANDIDA SPECIES, DNA PROBE: NEGATIVE
GARDNERELLA VAGINALIS, DNA PROBE: NEGATIVE
SOURCE: NORMAL
TRICHOMONAS VAGINALIS DNA: NEGATIVE

## 2023-05-30 LAB
C TRACH DNA SPEC QL PROBE+SIG AMP: ABNORMAL
N GONORRHOEA DNA SPEC QL PROBE+SIG AMP: ABNORMAL
SPECIMEN DESCRIPTION: ABNORMAL

## 2023-06-06 ENCOUNTER — HOSPITAL ENCOUNTER (OUTPATIENT)
Age: 23
Setting detail: SPECIMEN
Discharge: HOME | End: 2023-06-06

## 2023-06-06 DIAGNOSIS — Z11.3 SCREEN FOR STD (SEXUALLY TRANSMITTED DISEASE): ICD-10-CM

## 2023-06-06 DIAGNOSIS — Z11.3 SCREEN FOR STD (SEXUALLY TRANSMITTED DISEASE): Primary | ICD-10-CM

## 2023-06-07 LAB
CHLAMYDIA DNA UR QL NAA+PROBE: NEGATIVE
N GONORRHOEA DNA UR QL NAA+PROBE: NEGATIVE
SPECIMEN DESCRIPTION: NORMAL

## 2024-06-25 ENCOUNTER — OFFICE VISIT (OUTPATIENT)
Dept: OBGYN CLINIC | Age: 24
End: 2024-06-25
Payer: COMMERCIAL

## 2024-06-25 ENCOUNTER — HOSPITAL ENCOUNTER (OUTPATIENT)
Age: 24
Setting detail: SPECIMEN
Discharge: HOME | End: 2024-06-25

## 2024-06-25 VITALS
DIASTOLIC BLOOD PRESSURE: 70 MMHG | SYSTOLIC BLOOD PRESSURE: 114 MMHG | HEIGHT: 68 IN | WEIGHT: 214 LBS | BODY MASS INDEX: 32.43 KG/M2

## 2024-06-25 DIAGNOSIS — Z11.3 ROUTINE SCREENING FOR STI (SEXUALLY TRANSMITTED INFECTION): ICD-10-CM

## 2024-06-25 DIAGNOSIS — Z12.4 CERVICAL CANCER SCREENING: Primary | ICD-10-CM

## 2024-06-25 PROBLEM — Z76.89: Status: RESOLVED | Noted: 2022-01-10 | Resolved: 2024-06-25

## 2024-06-25 PROBLEM — O09.30 LATE PRENATAL CARE: Status: RESOLVED | Noted: 2022-02-24 | Resolved: 2024-06-25

## 2024-06-25 PROBLEM — O99.810 ABNORMAL GLUCOSE TOLERANCE AFFECTING PREGNANCY, ANTEPARTUM: Status: RESOLVED | Noted: 2022-01-10 | Resolved: 2024-06-25

## 2024-06-25 LAB
CANDIDA SPECIES: NEGATIVE
GARDNERELLA VAGINALIS: NEGATIVE
SOURCE: NORMAL
TRICHOMONAS: NEGATIVE

## 2024-06-25 PROCEDURE — 99395 PREV VISIT EST AGE 18-39: CPT | Performed by: OBSTETRICS & GYNECOLOGY

## 2024-06-25 RX ORDER — LEVONORGESTREL AND ETHINYL ESTRADIOL 0.1-0.02MG
1 KIT ORAL DAILY
Qty: 1 PACKET | Refills: 11 | Status: SHIPPED | OUTPATIENT
Start: 2024-06-25

## 2024-06-25 RX ORDER — IBUPROFEN 800 MG/1
800 TABLET ORAL EVERY 6 HOURS PRN
COMMUNITY
Start: 2024-05-02

## 2024-06-25 SDOH — ECONOMIC STABILITY: FOOD INSECURITY: WITHIN THE PAST 12 MONTHS, YOU WORRIED THAT YOUR FOOD WOULD RUN OUT BEFORE YOU GOT MONEY TO BUY MORE.: NEVER TRUE

## 2024-06-25 SDOH — ECONOMIC STABILITY: INCOME INSECURITY: HOW HARD IS IT FOR YOU TO PAY FOR THE VERY BASICS LIKE FOOD, HOUSING, MEDICAL CARE, AND HEATING?: NOT VERY HARD

## 2024-06-25 SDOH — ECONOMIC STABILITY: FOOD INSECURITY: WITHIN THE PAST 12 MONTHS, THE FOOD YOU BOUGHT JUST DIDN'T LAST AND YOU DIDN'T HAVE MONEY TO GET MORE.: NEVER TRUE

## 2024-06-25 SDOH — ECONOMIC STABILITY: HOUSING INSECURITY
IN THE LAST 12 MONTHS, WAS THERE A TIME WHEN YOU DID NOT HAVE A STEADY PLACE TO SLEEP OR SLEPT IN A SHELTER (INCLUDING NOW)?: NO

## 2024-06-25 ASSESSMENT — PATIENT HEALTH QUESTIONNAIRE - PHQ9
SUM OF ALL RESPONSES TO PHQ QUESTIONS 1-9: 0
SUM OF ALL RESPONSES TO PHQ9 QUESTIONS 1 & 2: 0
SUM OF ALL RESPONSES TO PHQ QUESTIONS 1-9: 0
SUM OF ALL RESPONSES TO PHQ QUESTIONS 1-9: 0
2. FEELING DOWN, DEPRESSED OR HOPELESS: NOT AT ALL
1. LITTLE INTEREST OR PLEASURE IN DOING THINGS: NOT AT ALL
SUM OF ALL RESPONSES TO PHQ QUESTIONS 1-9: 0

## 2024-06-25 NOTE — PROGRESS NOTES
Arthritis,Gout,Osteoporosis or Rheumatism  Neurological ROS: No CVA, Migraines, Epilepsy, Seizure Hx, or Limb Weakness  Dermatological ROS: No Rash, Itching, Hives, Mole Changes or Cancer                                                                                                                                                                                                                                  PHYSICAL Exam:     Constitutional:  Vitals:    06/25/24 1045   BP: 114/70   Site: Left Upper Arm   Position: Sitting   Cuff Size: Large Adult   Weight: 97.1 kg (214 lb)   Height: 1.715 m (5' 7.5\")       Chaperone for Intimate Exam  Chaperone was offered and accepted as part of the rooming process.  Chaperone: Uyen Cardenas          General Appearance:  This  is a well Developed, well Nourished, well groomed female.      Her BMI was reviewed. Nutritional decision making was discussed.    Skin:  There was a Normal Inspection of the skin without rashes or lesions.  There were no rashes.      Lymphatic:  No Lymph Nodes were Palpable in the neck , axilla or groin.  0 # Of Lymph Nodes     Neck and EENT:  The neck was supple. There were no masses   The thyroid was not enlarged and had no masses.  EOMI B/L    Respiratory:  The lungs were auscultated and found to be clear. There were no rales, rhonchi or wheezes. There was a good respiratory effort.    Cardiovascular:  The heart was in a regular rate and rhythm. . No S3 or S4. There was no murmur appreciated. Location, grade, and radiation are not applicable.     Extremities:  The patients extremities were without calf tenderness, edema, or varicosities.  There was full range of motion in all four extremities. Pulses in all four extremities were appreciated and are 2/4.    Abdomen:  The abdomen was soft and non-tender. There were good bowel sounds in all quadrants and there was no guarding, rebound or rigidity.      Abdominal Scars:     Psych:  The patient had a normal

## 2024-06-27 LAB
C TRACH DNA SPEC QL PROBE+SIG AMP: NEGATIVE
N GONORRHOEA DNA SPEC QL PROBE+SIG AMP: NEGATIVE
SPECIMEN DESCRIPTION: NORMAL

## 2024-07-08 LAB — CYTOLOGY REPORT: NORMAL

## 2024-10-29 ENCOUNTER — TELEPHONE (OUTPATIENT)
Dept: OBGYN CLINIC | Age: 24
End: 2024-10-29

## 2024-10-29 NOTE — TELEPHONE ENCOUNTER
Lamar Stephenson calling reporting a positive pregnancy test.    Lamar Stephenson is not a new patient.     This is not Lamar Stephenson's first pregnancy.    ** Please complete if patient has previous birth history, please delete is not applicable**  Number of pregnancies: 2  Mode of delivery (vaginal//both) vag  If NEW patient please instruct patient will need prior OBGYN records.     Lamar Stephenson last menstrual cycle: 10/6/24  Based on LMP patient is 3w2d weeks     Dose patient have any concerns?   [x]YES gdm, pree  []NO  If yes, please discuss concern with provider to determine if patient needs additional appointment.      Scheduling Instructions and Education  [x]If patient less than 8 weeks please schedule missed menses (30 mins) between 6-8 weeks. ALSO scheduled USN w/ IPV (w/ NURSE) to follow 2-3 weeks after missed menses   Patient education: Initial missed menses appointment will consist of a pregnancy test and to establish care and review previous births **if applicable**. There will NOT be an ultrasound at this first visit. Please review that the USN and IPV visit will be 2-3 weeks after patient's missed menses. At this appointment dating ultrasound will be complete, prenatal labs ordered, prenatal education completed, pelvic   Please document appointments scheduled during phone call   Missed menses date/provider: 12/3 11:00 nathaniel  USN/IPV date:usn 12/10 9:00, ipv 9:45    Please forward telephone encounter to provider appointments are scheduled with prior to closing telephone encounter.

## 2024-10-30 ENCOUNTER — OFFICE VISIT (OUTPATIENT)
Dept: FAMILY MEDICINE CLINIC | Age: 24
End: 2024-10-30

## 2024-10-30 ENCOUNTER — HOSPITAL ENCOUNTER (OUTPATIENT)
Age: 24
Setting detail: SPECIMEN
Discharge: HOME | End: 2024-10-30

## 2024-10-30 VITALS
BODY MASS INDEX: 30.71 KG/M2 | WEIGHT: 199 LBS | SYSTOLIC BLOOD PRESSURE: 104 MMHG | DIASTOLIC BLOOD PRESSURE: 72 MMHG | RESPIRATION RATE: 16 BRPM | HEART RATE: 96 BPM | TEMPERATURE: 98.5 F | OXYGEN SATURATION: 98 %

## 2024-10-30 DIAGNOSIS — R30.0 DYSURIA: ICD-10-CM

## 2024-10-30 DIAGNOSIS — N76.0 ACUTE VAGINITIS: Primary | ICD-10-CM

## 2024-10-30 LAB
BILIRUBIN, POC: NORMAL
BLOOD URINE, POC: NORMAL
CLARITY, POC: CLEAR
COLOR, POC: NORMAL
GLUCOSE URINE, POC: NORMAL MG/DL
KETONES, POC: 15 MG/DL
LEUKOCYTE EST, POC: NORMAL
NITRITE, POC: NORMAL
PH, POC: 6
PROTEIN, POC: 100 MG/DL
SPECIFIC GRAVITY, POC: 1.02
UROBILINOGEN, POC: 1 MG/DL

## 2024-10-30 RX ORDER — CEPHALEXIN 500 MG/1
500 CAPSULE ORAL 3 TIMES DAILY
Qty: 21 CAPSULE | Refills: 0 | Status: SHIPPED | OUTPATIENT
Start: 2024-10-30 | End: 2024-11-06

## 2024-10-30 ASSESSMENT — ENCOUNTER SYMPTOMS
SHORTNESS OF BREATH: 0
WHEEZING: 0
VOMITING: 0
NAUSEA: 0
ABDOMINAL PAIN: 0

## 2024-10-30 NOTE — PROGRESS NOTES
[facility]  [address]  [address]  [facility] [address]    Lamar Stephenson is a 23 y.o. female who presents today for her medical conditions/complaints of Dysuria (Burning with urination x 3 days) and Vaginal Discharge (Clear, itching /2 positive at home pregnancy test)          HPI:     /72   Pulse 96   Temp 98.5 °F (36.9 °C)   Resp 16   Wt 90.3 kg (199 lb)   LMP 10/06/2024 (Exact Date)   SpO2 98%   BMI 30.71 kg/m²       Urinary Frequency   This is a new problem. The current episode started within the past week. The problem occurs every urination. The problem has been unchanged. The quality of the pain is described as burning. The pain is moderate. There has been no fever. Associated symptoms include frequency, hesitancy and urgency. Pertinent negatives include no chills, discharge, flank pain, nausea, sweats or vomiting. She has tried increased fluids for the symptoms. The treatment provided no relief.  Pt is pregnant     Past Medical History:   Diagnosis Date    ADHD (attention deficit hyperactivity disorder)     Anxiety     Preeclampsia w/o SF (G2) 3/4/2022    Met criteria with elevated blood pressures and proteinuria      M Apg 8/9 Wt 7#3 3/4/2022    Urinary reflux         Past Surgical History:   Procedure Laterality Date    TONSILLECTOMY      TYMPANOSTOMY TUBE PLACEMENT         History reviewed. No pertinent family history.    Social History     Tobacco Use    Smoking status: Every Day     Current packs/day: 0.50     Types: Cigarettes    Smokeless tobacco: Never   Substance Use Topics    Alcohol use: No        Prior to Visit Medications    Medication Sig Taking? Authorizing Provider   ibuprofen (ADVIL;MOTRIN) 800 MG tablet Take 1 tablet by mouth every 6 hours as needed  Patient not taking: Reported on 10/30/2024  Provider, MD Sophy   Levonorgest-Eth Estradiol-Iron 0.1-20 MG-MCG(21) TABS Take 1 tablet by mouth daily  Patient not taking: Reported on

## 2024-10-31 LAB
CANDIDA SPECIES: NEGATIVE
GARDNERELLA VAGINALIS: NEGATIVE
MICROORGANISM SPEC CULT: NORMAL
SERVICE CMNT-IMP: NORMAL
SOURCE: NORMAL
SPECIMEN DESCRIPTION: NORMAL
TRICHOMONAS: NEGATIVE

## 2024-11-04 ENCOUNTER — TELEPHONE (OUTPATIENT)
Dept: OBGYN CLINIC | Age: 24
End: 2024-11-04

## 2024-11-04 DIAGNOSIS — N91.2 AMENORRHEA: Primary | ICD-10-CM

## 2024-11-04 DIAGNOSIS — O20.9 BLEEDING IN EARLY PREGNANCY: ICD-10-CM

## 2024-11-04 NOTE — TELEPHONE ENCOUNTER
Patient called with heavy bleeding and clots last last night, filled a pad. Patient has a missed menses appt on 12/3/24.     Patient advised to go to ER for evaluation if she begins to fill more than 1 pad an hour for 3 hours, has golf ball sized clots, feels light headed or dizzy. Patient expressed understanding.    Patient will get hcg drawn.   PA/NP only visit

## 2024-11-05 ENCOUNTER — HOSPITAL ENCOUNTER (EMERGENCY)
Age: 24
Discharge: HOME | End: 2024-11-05
Attending: EMERGENCY MEDICINE
Payer: COMMERCIAL

## 2024-11-05 ENCOUNTER — TELEPHONE (OUTPATIENT)
Dept: OBGYN CLINIC | Age: 24
End: 2024-11-05

## 2024-11-05 VITALS
TEMPERATURE: 98.5 F | SYSTOLIC BLOOD PRESSURE: 133 MMHG | RESPIRATION RATE: 18 BRPM | BODY MASS INDEX: 31.23 KG/M2 | WEIGHT: 199 LBS | OXYGEN SATURATION: 98 % | HEIGHT: 67 IN | DIASTOLIC BLOOD PRESSURE: 88 MMHG | HEART RATE: 64 BPM

## 2024-11-05 DIAGNOSIS — E87.6 HYPOKALEMIA: ICD-10-CM

## 2024-11-05 DIAGNOSIS — N93.9 VAGINAL BLEEDING: Primary | ICD-10-CM

## 2024-11-05 LAB
ANION GAP SERPL CALCULATED.3IONS-SCNC: 10 MMOL/L (ref 9–17)
B-HCG SERPL EIA 3RD IS-ACNC: <1 MIU/ML
BACTERIA URNS QL MICRO: ABNORMAL
BASOPHILS # BLD: 0.1 K/UL (ref 0–0.2)
BASOPHILS NFR BLD: 1 % (ref 0–2)
BILIRUB UR QL STRIP: ABNORMAL
BUN SERPL-MCNC: 6 MG/DL (ref 6–20)
CALCIUM SERPL-MCNC: 9.4 MG/DL (ref 8.6–10.4)
CHARACTER UR: ABNORMAL
CHLORIDE SERPL-SCNC: 103 MMOL/L (ref 98–107)
CLARITY UR: ABNORMAL
CO2 SERPL-SCNC: 28 MMOL/L (ref 20–31)
COLOR UR: ABNORMAL
CREAT SERPL-MCNC: 0.8 MG/DL (ref 0.5–0.9)
EOSINOPHIL # BLD: 0.2 K/UL (ref 0–0.4)
EOSINOPHILS RELATIVE PERCENT: 3 % (ref 1–4)
EPI CELLS #/AREA URNS HPF: ABNORMAL /HPF (ref 0–5)
ERYTHROCYTE [DISTWIDTH] IN BLOOD BY AUTOMATED COUNT: 14 % (ref 12.5–15.4)
GFR, ESTIMATED: >90 ML/MIN/1.73M2
GLUCOSE SERPL-MCNC: 124 MG/DL (ref 70–99)
GLUCOSE UR STRIP-MCNC: NEGATIVE MG/DL
HCT VFR BLD AUTO: 40 % (ref 36–46)
HGB BLD-MCNC: 13.6 G/DL (ref 12–16)
HGB UR QL STRIP.AUTO: ABNORMAL
KETONES UR STRIP-MCNC: ABNORMAL MG/DL
LEUKOCYTE ESTERASE UR QL STRIP: ABNORMAL
LYMPHOCYTES NFR BLD: 2.2 K/UL (ref 1–4.8)
LYMPHOCYTES RELATIVE PERCENT: 30 % (ref 24–44)
MCH RBC QN AUTO: 26.5 PG (ref 26–34)
MCHC RBC AUTO-ENTMCNC: 34.1 G/DL (ref 31–37)
MCV RBC AUTO: 77.6 FL (ref 80–100)
MONOCYTES NFR BLD: 0.6 K/UL (ref 0.1–1.2)
MONOCYTES NFR BLD: 8 % (ref 2–11)
NEUTROPHILS NFR BLD: 58 % (ref 36–66)
NEUTS SEG NFR BLD: 4.5 K/UL (ref 1.8–7.7)
NITRITE UR QL STRIP: NEGATIVE
PH UR STRIP: 6.5 [PH] (ref 5–8)
PLATELET # BLD AUTO: 368 K/UL (ref 140–450)
PMV BLD AUTO: 6.6 FL (ref 6–12)
POTASSIUM SERPL-SCNC: 3.3 MMOL/L (ref 3.7–5.3)
PROT UR STRIP-MCNC: ABNORMAL MG/DL
RBC # BLD AUTO: 5.15 M/UL (ref 4–5.2)
RBC #/AREA URNS HPF: ABNORMAL /HPF (ref 0–2)
SODIUM SERPL-SCNC: 141 MMOL/L (ref 135–144)
SP GR UR STRIP: 1.02 (ref 1–1.03)
UROBILINOGEN UR STRIP-ACNC: ABNORMAL EU/DL (ref 0–1)
WBC #/AREA URNS HPF: ABNORMAL /HPF (ref 0–5)
WBC OTHER # BLD: 7.6 K/UL (ref 3.5–11)

## 2024-11-05 PROCEDURE — 87086 URINE CULTURE/COLONY COUNT: CPT

## 2024-11-05 PROCEDURE — 36415 COLL VENOUS BLD VENIPUNCTURE: CPT

## 2024-11-05 PROCEDURE — 80048 BASIC METABOLIC PNL TOTAL CA: CPT

## 2024-11-05 PROCEDURE — 6370000000 HC RX 637 (ALT 250 FOR IP): Performed by: NURSE PRACTITIONER

## 2024-11-05 PROCEDURE — 81001 URINALYSIS AUTO W/SCOPE: CPT

## 2024-11-05 PROCEDURE — 85025 COMPLETE CBC W/AUTO DIFF WBC: CPT

## 2024-11-05 PROCEDURE — 99283 EMERGENCY DEPT VISIT LOW MDM: CPT

## 2024-11-05 PROCEDURE — 84702 CHORIONIC GONADOTROPIN TEST: CPT

## 2024-11-05 RX ORDER — POTASSIUM CHLORIDE 1500 MG/1
40 TABLET, EXTENDED RELEASE ORAL ONCE
Status: DISCONTINUED | OUTPATIENT
Start: 2024-11-05 | End: 2024-11-05 | Stop reason: HOSPADM

## 2024-11-05 ASSESSMENT — LIFESTYLE VARIABLES
HOW MANY STANDARD DRINKS CONTAINING ALCOHOL DO YOU HAVE ON A TYPICAL DAY: PATIENT DOES NOT DRINK
HOW OFTEN DO YOU HAVE A DRINK CONTAINING ALCOHOL: NEVER

## 2024-11-05 ASSESSMENT — PAIN SCALES - GENERAL: PAINLEVEL_OUTOF10: 3

## 2024-11-05 NOTE — ED PROVIDER NOTES
[facility] EMERGENCY DEPARTMENT  EMERGENCY DEPARTMENT ENCOUNTER      Pt Name: Lamar Stephenson  MRN: [id number]  Birthdate 2000  Date of evaluation: 11/5/2024  Provider: FIDEL Yung CNP    CHIEF COMPLAINT       Chief Complaint   Patient presents with    Vaginal Bleeding     Vaginal bleeding that started on Sunday night- heavy bleeding then stopped last night and started again this morning. Reports clots the size of a dime.          HISTORY OF PRESENT ILLNESS   (Location/Symptom, Timing/Onset, Context/Setting, Quality, Duration, Modifying Factors, Severity)  Note limiting factors.   Lamar Stephenson is a 23 y.o. female who presents to the emergency department for evaluation of vaginal bleeding that started 2 days ago.  Patient states her last menstrual period was on 10/6/2024.  States she took a home pregnancy test couple days ago that was positive.  She states she then began bleeding on 11/3/2024.  She states she did not have much bleeding yesterday, but then began bleeding heavily again this morning.  She states she is passing small clots the size of a dime on occasion she denies saturating multiple pads per hour.  Denies any significant abdominal pain or back pain.  Denies any fevers or chills she is G3, P2.  No miscarriages or abortions at this time.         TYPE AND SCREEN  Order: [id number]  Status: Final result       Visible to patient: Yes (seen)       Next appt: 12/03/2024 at 11:00 AM in Obstetrics and Gynecology (FIDEL Han CNP)    0 Result Notes      Component 3/3/22 2150   Expiration Date 03/06/2022,2359   Arm Band Number [id number]   ABO/Rh A NEGATIVE   Antibody Screen POSITIVE   Antibody ID Anti-D, Passive Due To RhIG   Resulting Agency [facility]              Specimen Collected: 03/03/22 21:50 EST Last Resulted: 03/03/22 22:23 E               Nursing Notes were reviewed.    REVIEW OF SYSTEMS           Except as noted above the remainder of the review

## 2024-11-05 NOTE — DISCHARGE INSTRUCTIONS
Your pregnancy hormone is negative.    Please follow-up with your OB/GYN.  Call the office today to schedule an appointment for reevaluation by the end of the week    Eat foods high in potassium    Drink plenty of fluids to maintain hydration    If any symptoms worsen you develop abdominal pain or any new or concerning symptoms arise please return immediately to the emergency department

## 2024-11-05 NOTE — TELEPHONE ENCOUNTER
Her LMP was 10/6/24 and her hcg in ER was negative.      She has order for HCg in system that she can repeat in 2 days, we can try to get her in this week also I know I do not have anything tomorrow I should have a same day Thursday at 245 available now if she wants that or check with other providers.

## 2024-11-05 NOTE — TELEPHONE ENCOUNTER
Pt called to schedule an appt for an ER follow up. She said the ER told her to follow up in 2 days. They did labs, but no US. She was scheduled for a missed menses appt for 12/3. How soon does she need to be seen?

## 2024-11-05 NOTE — ED PROVIDER NOTES
[facility] EMERGENCY DEPARTMENT  eMERGENCY dEPARTMENT eNCOUnter   Independent Attestation     Pt Name: Lamar Stephenson  MRN: [id number]  Birthdate 2000  Date of evaluation: 11/5/24       Lamar Stephenson is a 23 y.o. female who presents with Vaginal Bleeding (Vaginal bleeding that started on Sunday night- heavy bleeding then stopped last night and started again this morning. Reports clots the size of a dime. )        Based on the medical record, the care appears appropriate. I was personally available for consultation in the Emergency Department.    Leonidas Wright DO  Attending Emergency  Physician                Leonidas Wright DO  11/05/24 [id number]

## 2024-11-06 LAB
MICROORGANISM SPEC CULT: NORMAL
SPECIMEN DESCRIPTION: NORMAL

## 2024-11-07 ENCOUNTER — OFFICE VISIT (OUTPATIENT)
Dept: OBGYN CLINIC | Age: 24
End: 2024-11-07
Payer: COMMERCIAL

## 2024-11-07 VITALS
WEIGHT: 198.8 LBS | SYSTOLIC BLOOD PRESSURE: 108 MMHG | HEIGHT: 67 IN | RESPIRATION RATE: 16 BRPM | BODY MASS INDEX: 31.2 KG/M2 | DIASTOLIC BLOOD PRESSURE: 80 MMHG

## 2024-11-07 DIAGNOSIS — N93.9 VAGINAL BLEEDING: Primary | ICD-10-CM

## 2024-11-07 DIAGNOSIS — E87.6 HYPOKALEMIA: ICD-10-CM

## 2024-11-07 DIAGNOSIS — Z31.69 PRE-CONCEPTION COUNSELING: ICD-10-CM

## 2024-11-07 PROCEDURE — 99214 OFFICE O/P EST MOD 30 MIN: CPT | Performed by: NURSE PRACTITIONER

## 2024-11-07 RX ORDER — .BETA.-CAROTENE, ASCORBIC ACID, CHOLECALCIFEROL, .ALPHA.-TOCOPHEROL ACETATE, DL-, THIAMINE, RIBOFLAVIN, NIACINAMIDE, PYRIDOXINE HYDROCHLORIDE, FOLIC ACID, CYANOCOBALAMIN, CALCIUM PANTOTHENATE, CALCIUM CARBONATE, FERROUS FUMARATE, ZINC OXIDE AND DOCUSATE SODIUM 1000; 100; 400; 30; 3; 3; 15; 20; 1; 12; 7; 200; 29; 20; 25 [IU]/1; MG/1; [IU]/1; MG/1; MG/1; MG/1; MG/1; MG/1; MG/1; UG/1; MG/1; MG/1; MG/1; MG/1; MG/1
1 TABLET ORAL DAILY
Qty: 30 TABLET | Refills: 11 | Status: SHIPPED | OUTPATIENT
Start: 2024-11-07

## 2024-11-07 NOTE — PROGRESS NOTES
Subjective:  Lamar is in for f/u from ER.     She stated her LMP was 10/6/24 and she states on 10/28/2024 she took 2 at home pregnancy test that were positive.  She states that her cycle is typically around 25 days and last 4 to 5 days.  She states that they were starting to try for pregnancy at this point.  She states on 11/4/2020 4 at night started to have bleeding with clots it stopped and then returned the next morning she went to the ER that day.  She also had some cramping with this knee denies severe one-sided pelvic pain.  She had been to walk-in clinic on 10/30/2024 so for some concerns of possible UTI or bacterial infection.  She had been given Keflex antibiotic for possible UTI given urine dip her urine culture ended up being negative.  Her she had a vaginitis DNA swab that was also negative.  She had a repeat urinalysis and urine culture in the emergency department also on 11/24 urine culture was negative.  ER did blood work and her hCG quant was negative less than 5.  CBC was unremarkable.  BMP showed a low potassium level she states she tried to take the potassium but had a hard time swallowing it.  She has been trying to increase potassium in her diet.  Denies palpitations or muscle cramping.    She states her vaginal bleeding has stopped since last night.  Denies any pelvic pain or cramping at this point.  She denies fevers, chills, nausea or vomiting.  She denies UTI symptoms or vaginal discharge odor or itching at this time.      She denies any history of miscarriage or ectopic pregnancy.    Review of systems per HPI, otherwise negative.    Allergies; medications; past medical, surgical, family, and social history; and problem list reviewed as indicated in this encounter.    Objective:  Vitals:  Blood pressure 108/80, resp. rate 16, height 1.702 m (5' 7\"), weight 90.2 kg (198 lb 12.8 oz), last menstrual period 10/06/2024, not currently breastfeeding.  Constitutional: She is oriented to person,

## 2025-05-18 ENCOUNTER — HOSPITAL ENCOUNTER (EMERGENCY)
Age: 25
Discharge: HOME | End: 2025-05-18
Attending: EMERGENCY MEDICINE
Payer: COMMERCIAL

## 2025-05-18 ENCOUNTER — APPOINTMENT (OUTPATIENT)
Dept: GENERAL RADIOLOGY | Age: 25
End: 2025-05-18
Payer: COMMERCIAL

## 2025-05-18 VITALS
HEIGHT: 67 IN | BODY MASS INDEX: 31.14 KG/M2 | TEMPERATURE: 98.2 F | HEART RATE: 65 BPM | DIASTOLIC BLOOD PRESSURE: 77 MMHG | SYSTOLIC BLOOD PRESSURE: 138 MMHG | OXYGEN SATURATION: 100 % | RESPIRATION RATE: 16 BRPM

## 2025-05-18 DIAGNOSIS — S90.31XA CONTUSION OF RIGHT FOOT, INITIAL ENCOUNTER: Primary | ICD-10-CM

## 2025-05-18 PROCEDURE — 73630 X-RAY EXAM OF FOOT: CPT

## 2025-05-18 PROCEDURE — 99283 EMERGENCY DEPT VISIT LOW MDM: CPT

## 2025-05-18 ASSESSMENT — PAIN DESCRIPTION - LOCATION: LOCATION: FOOT

## 2025-05-18 ASSESSMENT — PAIN - FUNCTIONAL ASSESSMENT: PAIN_FUNCTIONAL_ASSESSMENT: 0-10

## 2025-05-18 ASSESSMENT — PAIN SCALES - GENERAL: PAINLEVEL_OUTOF10: 9

## 2025-05-18 ASSESSMENT — PAIN DESCRIPTION - ORIENTATION: ORIENTATION: RIGHT

## 2025-05-19 NOTE — ED PROVIDER NOTES
[facility]  eMERGENCY dEPARTMENT eNCOUnter      Pt Name: Lamar Stephenson  MRN: [id number]  Birthdate 2000  Date of evaluation: 2025      CHIEF COMPLAINT       Chief Complaint   Patient presents with    Foot Pain     Right foot, states a rock fell on foot yesterday, pain to top of foot.          HISTORY OF PRESENT ILLNESS    Lamar Stephenson is a 24 y.o. female who presents with a chief complaint of right foot pain she said her rock plaque fell on her foot yesterday has been pain across the dorsum of her foot she has been to ambulate she had an old fracture boot she has been wearing.  No ankle knee or hip pain      REVIEW OF SYSTEMS         Review of Systems   Constitutional:  Negative for chills and fever.   HENT:  Negative for congestion and ear pain.    Eyes:  Negative for pain and visual disturbance.   Respiratory:  Negative for cough and shortness of breath.    Cardiovascular:  Negative for chest pain and leg swelling.   Gastrointestinal:  Negative for abdominal pain, blood in stool, constipation, diarrhea, nausea and vomiting.   Musculoskeletal:  Positive for arthralgias. Negative for back pain, joint swelling, myalgias, neck pain and neck stiffness.        Right foot pain as described   Skin:  Negative for rash.   Neurological:  Negative for dizziness, weakness and headaches.   Hematological:  Negative for adenopathy. Does not bruise/bleed easily.   Psychiatric/Behavioral:  Negative for confusion, self-injury and suicidal ideas.          PAST MEDICAL HISTORY    has a past medical history of ADHD (attention deficit hyperactivity disorder), Anxiety, Preeclampsia w/o SF (G2),  M Apg 8/9 Wt 7#3, and Urinary reflux.    SURGICAL HISTORY      has a past surgical history that includes Tonsillectomy and Tympanostomy tube placement.    CURRENT MEDICATIONS       Discharge Medication List as of 2025  9:54 PM        CONTINUE these medications which have NOT CHANGED

## 2025-05-19 NOTE — DISCHARGE INSTRUCTIONS
Will Tylenol ibuprofen for discomfort ice and elevate follow-up with your doctor return for any problem